# Patient Record
Sex: FEMALE | Race: WHITE | Employment: PART TIME | URBAN - NONMETROPOLITAN AREA
[De-identification: names, ages, dates, MRNs, and addresses within clinical notes are randomized per-mention and may not be internally consistent; named-entity substitution may affect disease eponyms.]

---

## 2017-04-07 ENCOUNTER — TELEPHONE (OUTPATIENT)
Dept: SURGERY | Age: 22
End: 2017-04-07

## 2017-04-07 ENCOUNTER — APPOINTMENT (OUTPATIENT)
Dept: ULTRASOUND IMAGING | Age: 22
End: 2017-04-07
Payer: MEDICAID

## 2017-04-07 ENCOUNTER — HOSPITAL ENCOUNTER (EMERGENCY)
Age: 22
Discharge: HOME | End: 2017-04-07
Attending: EMERGENCY MEDICINE
Payer: MEDICAID

## 2017-04-07 VITALS
DIASTOLIC BLOOD PRESSURE: 88 MMHG | TEMPERATURE: 97.5 F | BODY MASS INDEX: 33.46 KG/M2 | SYSTOLIC BLOOD PRESSURE: 138 MMHG | OXYGEN SATURATION: 97 % | HEIGHT: 64 IN | RESPIRATION RATE: 20 BRPM | WEIGHT: 196 LBS | HEART RATE: 83 BPM

## 2017-04-07 DIAGNOSIS — K80.20 CALCULUS OF GALLBLADDER WITHOUT CHOLECYSTITIS WITHOUT OBSTRUCTION: Primary | ICD-10-CM

## 2017-04-07 DIAGNOSIS — K80.50 BILIARY COLIC: ICD-10-CM

## 2017-04-07 LAB
ALBUMIN SERPL-MCNC: 4.6 G/DL (ref 3.5–5.2)
ALP BLD-CCNC: 90 U/L (ref 35–104)
ALT SERPL-CCNC: 24 U/L (ref 5–33)
ANION GAP SERPL CALCULATED.3IONS-SCNC: 12 MMOL/L (ref 7–19)
AST SERPL-CCNC: 21 U/L (ref 5–32)
BASOPHILS ABSOLUTE: 0 K/UL (ref 0–0.2)
BASOPHILS RELATIVE PERCENT: 0.2 % (ref 0–1)
BILIRUB SERPL-MCNC: <0.2 MG/DL (ref 0.2–1.2)
BILIRUBIN URINE: NEGATIVE
BLOOD, URINE: NEGATIVE
BUN BLDV-MCNC: 11 MG/DL (ref 6–20)
CALCIUM SERPL-MCNC: 9.1 MG/DL (ref 8.6–10)
CHLORIDE BLD-SCNC: 103 MMOL/L (ref 98–111)
CLARITY: ABNORMAL
CO2: 25 MMOL/L (ref 22–29)
COLOR: YELLOW
CREAT SERPL-MCNC: 0.6 MG/DL (ref 0.5–0.9)
EOSINOPHILS ABSOLUTE: 0.2 K/UL (ref 0–0.6)
EOSINOPHILS RELATIVE PERCENT: 2.5 % (ref 0–5)
GFR NON-AFRICAN AMERICAN: >60
GLOBULIN: 2.7 G/DL
GLUCOSE BLD-MCNC: 111 MG/DL (ref 74–109)
GLUCOSE URINE: NEGATIVE MG/DL
HCG(URINE) PREGNANCY TEST: NEGATIVE
HCT VFR BLD CALC: 40.4 % (ref 37–47)
HEMOGLOBIN: 13.7 G/DL (ref 12–16)
KETONES, URINE: NEGATIVE MG/DL
LEUKOCYTE ESTERASE, URINE: NEGATIVE
LIPASE: 33 U/L (ref 13–60)
LYMPHOCYTES ABSOLUTE: 2.5 K/UL (ref 1.1–4.5)
LYMPHOCYTES RELATIVE PERCENT: 29.1 % (ref 20–40)
MCH RBC QN AUTO: 31.1 PG (ref 27–31)
MCHC RBC AUTO-ENTMCNC: 33.9 G/DL (ref 33–37)
MCV RBC AUTO: 91.8 FL (ref 81–99)
MONOCYTES ABSOLUTE: 0.4 K/UL (ref 0–0.9)
MONOCYTES RELATIVE PERCENT: 5.2 % (ref 0–10)
NEUTROPHILS ABSOLUTE: 5.3 K/UL (ref 1.5–7.5)
NEUTROPHILS RELATIVE PERCENT: 62.9 % (ref 50–65)
NITRITE, URINE: NEGATIVE
PDW BLD-RTO: 12.3 % (ref 11.5–14.5)
PH UA: 6
PLATELET # BLD: 383 K/UL (ref 130–400)
PMV BLD AUTO: 10.4 FL (ref 7.4–10.4)
POTASSIUM SERPL-SCNC: 4.1 MMOL/L (ref 3.5–5)
PROTEIN UA: NEGATIVE MG/DL
RBC # BLD: 4.4 M/UL (ref 4.2–5.4)
SODIUM BLD-SCNC: 140 MMOL/L (ref 136–145)
SPECIFIC GRAVITY UA: 1.02
TOTAL PROTEIN: 7.3 G/DL (ref 6.6–8.7)
UROBILINOGEN, URINE: 0.2 E.U./DL
WBC # BLD: 8.5 K/UL (ref 4.8–10.8)

## 2017-04-07 PROCEDURE — 81003 URINALYSIS AUTO W/O SCOPE: CPT

## 2017-04-07 PROCEDURE — 80053 COMPREHEN METABOLIC PANEL: CPT

## 2017-04-07 PROCEDURE — 85025 COMPLETE CBC W/AUTO DIFF WBC: CPT

## 2017-04-07 PROCEDURE — 6370000000 HC RX 637 (ALT 250 FOR IP): Performed by: EMERGENCY MEDICINE

## 2017-04-07 PROCEDURE — 96374 THER/PROPH/DIAG INJ IV PUSH: CPT

## 2017-04-07 PROCEDURE — 99284 EMERGENCY DEPT VISIT MOD MDM: CPT | Performed by: EMERGENCY MEDICINE

## 2017-04-07 PROCEDURE — 99284 EMERGENCY DEPT VISIT MOD MDM: CPT

## 2017-04-07 PROCEDURE — 76705 ECHO EXAM OF ABDOMEN: CPT

## 2017-04-07 PROCEDURE — 36415 COLL VENOUS BLD VENIPUNCTURE: CPT

## 2017-04-07 PROCEDURE — 83690 ASSAY OF LIPASE: CPT

## 2017-04-07 PROCEDURE — 6360000002 HC RX W HCPCS: Performed by: EMERGENCY MEDICINE

## 2017-04-07 PROCEDURE — 81025 URINE PREGNANCY TEST: CPT

## 2017-04-07 RX ORDER — KETOROLAC TROMETHAMINE 30 MG/ML
15 INJECTION, SOLUTION INTRAMUSCULAR; INTRAVENOUS ONCE
Status: COMPLETED | OUTPATIENT
Start: 2017-04-07 | End: 2017-04-07

## 2017-04-07 RX ORDER — ONDANSETRON 4 MG/1
4 TABLET, ORALLY DISINTEGRATING ORAL EVERY 8 HOURS PRN
Qty: 30 TABLET | Refills: 0 | Status: SHIPPED | OUTPATIENT
Start: 2017-04-07 | End: 2022-04-26

## 2017-04-07 RX ADMIN — Medication 30 ML: at 07:49

## 2017-04-07 RX ADMIN — KETOROLAC TROMETHAMINE 15 MG: 30 INJECTION, SOLUTION INTRAMUSCULAR at 07:49

## 2017-04-07 ASSESSMENT — ENCOUNTER SYMPTOMS
SHORTNESS OF BREATH: 0
NAUSEA: 1
VOMITING: 0
ABDOMINAL PAIN: 1
COUGH: 0

## 2017-04-07 ASSESSMENT — PAIN DESCRIPTION - DESCRIPTORS: DESCRIPTORS: BURNING;ACHING;SHARP

## 2017-04-07 ASSESSMENT — PAIN DESCRIPTION - PAIN TYPE: TYPE: ACUTE PAIN

## 2017-04-07 ASSESSMENT — PAIN DESCRIPTION - LOCATION: LOCATION: ABDOMEN

## 2017-04-07 ASSESSMENT — PAIN DESCRIPTION - FREQUENCY: FREQUENCY: CONTINUOUS

## 2017-04-07 ASSESSMENT — PAIN DESCRIPTION - PROGRESSION: CLINICAL_PROGRESSION: GRADUALLY WORSENING

## 2017-04-07 ASSESSMENT — PAIN DESCRIPTION - ONSET: ONSET: AWAKENED FROM SLEEP

## 2017-04-07 ASSESSMENT — PAIN SCALES - GENERAL: PAINLEVEL_OUTOF10: 5

## 2017-06-22 ENCOUNTER — OFFICE VISIT (OUTPATIENT)
Dept: CARDIOLOGY | Facility: CLINIC | Age: 22
End: 2017-06-22

## 2017-06-22 VITALS
HEIGHT: 64 IN | HEART RATE: 63 BPM | SYSTOLIC BLOOD PRESSURE: 108 MMHG | WEIGHT: 204 LBS | BODY MASS INDEX: 34.83 KG/M2 | DIASTOLIC BLOOD PRESSURE: 70 MMHG | OXYGEN SATURATION: 99 %

## 2017-06-22 DIAGNOSIS — I25.3 ATRIAL SEPTAL ANEURYSM: ICD-10-CM

## 2017-06-22 DIAGNOSIS — R00.2 PALPITATIONS: ICD-10-CM

## 2017-06-22 DIAGNOSIS — Q21.12 PFO (PATENT FORAMEN OVALE): Primary | ICD-10-CM

## 2017-06-22 DIAGNOSIS — E66.09 NON MORBID OBESITY DUE TO EXCESS CALORIES: ICD-10-CM

## 2017-06-22 PROBLEM — K21.9 GERD (GASTROESOPHAGEAL REFLUX DISEASE): Status: ACTIVE | Noted: 2017-06-22

## 2017-06-22 PROBLEM — E66.9 OBESITY: Status: ACTIVE | Noted: 2017-06-22

## 2017-06-22 PROCEDURE — 93000 ELECTROCARDIOGRAM COMPLETE: CPT | Performed by: INTERNAL MEDICINE

## 2017-06-22 PROCEDURE — 99214 OFFICE O/P EST MOD 30 MIN: CPT | Performed by: INTERNAL MEDICINE

## 2017-06-22 RX ORDER — EPINEPHRINE 0.3 MG/.3ML
0.3 INJECTION SUBCUTANEOUS AS NEEDED
COMMUNITY
Start: 2017-04-20 | End: 2018-06-20

## 2017-06-22 RX ORDER — ESCITALOPRAM OXALATE 10 MG/1
10 TABLET ORAL DAILY
COMMUNITY
Start: 2017-06-20 | End: 2018-06-20

## 2018-06-20 ENCOUNTER — OFFICE VISIT (OUTPATIENT)
Dept: CARDIOLOGY | Facility: CLINIC | Age: 23
End: 2018-06-20

## 2018-06-20 VITALS
DIASTOLIC BLOOD PRESSURE: 70 MMHG | SYSTOLIC BLOOD PRESSURE: 110 MMHG | OXYGEN SATURATION: 99 % | BODY MASS INDEX: 34.31 KG/M2 | HEART RATE: 94 BPM | WEIGHT: 201 LBS | HEIGHT: 64 IN

## 2018-06-20 DIAGNOSIS — R00.2 PALPITATIONS: ICD-10-CM

## 2018-06-20 DIAGNOSIS — Q21.12 PFO (PATENT FORAMEN OVALE): Primary | ICD-10-CM

## 2018-06-20 DIAGNOSIS — R06.02 SHORTNESS OF BREATH: ICD-10-CM

## 2018-06-20 DIAGNOSIS — I25.3 ATRIAL SEPTAL ANEURYSM: ICD-10-CM

## 2018-06-20 DIAGNOSIS — E66.09 CLASS 1 OBESITY DUE TO EXCESS CALORIES WITHOUT SERIOUS COMORBIDITY WITH BODY MASS INDEX (BMI) OF 34.0 TO 34.9 IN ADULT: ICD-10-CM

## 2018-06-20 PROCEDURE — 99214 OFFICE O/P EST MOD 30 MIN: CPT | Performed by: INTERNAL MEDICINE

## 2018-06-20 PROCEDURE — 93000 ELECTROCARDIOGRAM COMPLETE: CPT | Performed by: INTERNAL MEDICINE

## 2018-06-20 RX ORDER — NITROFURANTOIN 25; 75 MG/1; MG/1
100 CAPSULE ORAL 2 TIMES DAILY
COMMUNITY
Start: 2018-06-16 | End: 2018-06-24

## 2018-06-28 ENCOUNTER — OUTSIDE FACILITY SERVICE (OUTPATIENT)
Dept: CARDIOLOGY | Facility: CLINIC | Age: 23
End: 2018-06-28

## 2018-06-28 PROCEDURE — 93306 TTE W/DOPPLER COMPLETE: CPT | Performed by: INTERNAL MEDICINE

## 2018-06-29 ENCOUNTER — TELEPHONE (OUTPATIENT)
Dept: CARDIOLOGY | Facility: CLINIC | Age: 23
End: 2018-06-29

## 2019-06-19 ENCOUNTER — OFFICE VISIT (OUTPATIENT)
Dept: CARDIOLOGY | Facility: CLINIC | Age: 24
End: 2019-06-19

## 2019-06-19 VITALS
HEIGHT: 64 IN | BODY MASS INDEX: 36.37 KG/M2 | WEIGHT: 213 LBS | OXYGEN SATURATION: 99 % | HEART RATE: 69 BPM | DIASTOLIC BLOOD PRESSURE: 82 MMHG | SYSTOLIC BLOOD PRESSURE: 110 MMHG

## 2019-06-19 DIAGNOSIS — I25.3 ATRIAL SEPTAL ANEURYSM: ICD-10-CM

## 2019-06-19 DIAGNOSIS — R00.2 PALPITATIONS: ICD-10-CM

## 2019-06-19 DIAGNOSIS — R42 DIZZINESS: ICD-10-CM

## 2019-06-19 DIAGNOSIS — Q21.12 PFO (PATENT FORAMEN OVALE): Primary | ICD-10-CM

## 2019-06-19 DIAGNOSIS — E66.09 CLASS 2 OBESITY DUE TO EXCESS CALORIES WITHOUT SERIOUS COMORBIDITY WITH BODY MASS INDEX (BMI) OF 36.0 TO 36.9 IN ADULT: ICD-10-CM

## 2019-06-19 PROCEDURE — 93000 ELECTROCARDIOGRAM COMPLETE: CPT | Performed by: INTERNAL MEDICINE

## 2019-06-19 PROCEDURE — 99214 OFFICE O/P EST MOD 30 MIN: CPT | Performed by: INTERNAL MEDICINE

## 2019-07-12 ENCOUNTER — TELEPHONE (OUTPATIENT)
Dept: CARDIOLOGY | Facility: CLINIC | Age: 24
End: 2019-07-12

## 2019-10-21 ENCOUNTER — TELEPHONE (OUTPATIENT)
Dept: CARDIOLOGY | Facility: CLINIC | Age: 24
End: 2019-10-21

## 2020-03-02 ENCOUNTER — TELEPHONE (OUTPATIENT)
Dept: CARDIOLOGY | Facility: CLINIC | Age: 25
End: 2020-03-02

## 2020-03-02 DIAGNOSIS — Q21.10 ASD (ATRIAL SEPTAL DEFECT): Primary | ICD-10-CM

## 2020-03-13 ENCOUNTER — APPOINTMENT (OUTPATIENT)
Dept: CARDIOLOGY | Facility: HOSPITAL | Age: 25
End: 2020-03-13

## 2020-07-14 ENCOUNTER — TELEPHONE (OUTPATIENT)
Dept: CARDIOLOGY | Facility: CLINIC | Age: 25
End: 2020-07-14

## 2020-07-14 DIAGNOSIS — R00.2 PALPITATIONS: Primary | ICD-10-CM

## 2020-07-28 ENCOUNTER — TELEPHONE (OUTPATIENT)
Dept: CARDIOLOGY | Facility: CLINIC | Age: 25
End: 2020-07-28

## 2020-08-05 ENCOUNTER — OFFICE VISIT (OUTPATIENT)
Dept: CARDIOLOGY | Facility: CLINIC | Age: 25
End: 2020-08-05

## 2020-08-05 VITALS
WEIGHT: 205 LBS | BODY MASS INDEX: 35 KG/M2 | DIASTOLIC BLOOD PRESSURE: 80 MMHG | HEART RATE: 62 BPM | SYSTOLIC BLOOD PRESSURE: 120 MMHG | OXYGEN SATURATION: 98 % | HEIGHT: 64 IN

## 2020-08-05 DIAGNOSIS — I25.3 ATRIAL SEPTAL ANEURYSM: ICD-10-CM

## 2020-08-05 DIAGNOSIS — E66.09 CLASS 2 OBESITY DUE TO EXCESS CALORIES WITHOUT SERIOUS COMORBIDITY WITH BODY MASS INDEX (BMI) OF 35.0 TO 35.9 IN ADULT: ICD-10-CM

## 2020-08-05 DIAGNOSIS — Q21.12 PFO (PATENT FORAMEN OVALE): Primary | ICD-10-CM

## 2020-08-05 DIAGNOSIS — R07.9 CHEST PAIN, UNSPECIFIED TYPE: ICD-10-CM

## 2020-08-05 DIAGNOSIS — R00.2 PALPITATIONS: ICD-10-CM

## 2020-08-05 PROBLEM — E66.812 CLASS 2 OBESITY DUE TO EXCESS CALORIES WITHOUT SERIOUS COMORBIDITY WITH BODY MASS INDEX (BMI) OF 35.0 TO 35.9 IN ADULT: Status: ACTIVE | Noted: 2017-06-22

## 2020-08-05 PROCEDURE — 93000 ELECTROCARDIOGRAM COMPLETE: CPT | Performed by: INTERNAL MEDICINE

## 2020-08-05 PROCEDURE — 99214 OFFICE O/P EST MOD 30 MIN: CPT | Performed by: INTERNAL MEDICINE

## 2020-08-10 ENCOUNTER — OFFICE VISIT (OUTPATIENT)
Dept: UROLOGY | Facility: CLINIC | Age: 25
End: 2020-08-10

## 2020-08-10 VITALS — HEIGHT: 64 IN | TEMPERATURE: 97.4 F | BODY MASS INDEX: 35.37 KG/M2 | WEIGHT: 207.2 LBS

## 2020-08-10 DIAGNOSIS — N39.41 URGE INCONTINENCE OF URINE: ICD-10-CM

## 2020-08-10 DIAGNOSIS — R33.9 INCOMPLETE BLADDER EMPTYING: ICD-10-CM

## 2020-08-10 DIAGNOSIS — R35.0 FREQUENCY OF URINATION: ICD-10-CM

## 2020-08-10 DIAGNOSIS — R39.15 URGENCY OF URINATION: Primary | ICD-10-CM

## 2020-08-10 LAB
BILIRUB BLD-MCNC: NEGATIVE MG/DL
CLARITY, POC: CLEAR
COLOR UR: YELLOW
GLUCOSE UR STRIP-MCNC: NEGATIVE MG/DL
KETONES UR QL: NEGATIVE
LEUKOCYTE EST, POC: NEGATIVE
NITRITE UR-MCNC: NEGATIVE MG/ML
PH UR: 5 [PH] (ref 5–8)
PROT UR STRIP-MCNC: NEGATIVE MG/DL
RBC # UR STRIP: NEGATIVE /UL
SP GR UR: 1.03 (ref 1–1.03)
UROBILINOGEN UR QL: NORMAL

## 2020-08-10 PROCEDURE — 99204 OFFICE O/P NEW MOD 45 MIN: CPT | Performed by: UROLOGY

## 2020-08-10 PROCEDURE — 51798 US URINE CAPACITY MEASURE: CPT | Performed by: UROLOGY

## 2020-08-10 RX ORDER — OXYBUTYNIN CHLORIDE 5 MG/1
5 TABLET, EXTENDED RELEASE ORAL DAILY
Qty: 90 TABLET | Refills: 3 | Status: SHIPPED | OUTPATIENT
Start: 2020-08-10 | End: 2021-08-05

## 2020-11-30 ENCOUNTER — TELEPHONE (OUTPATIENT)
Dept: NEUROLOGY | Age: 25
End: 2020-11-30

## 2020-11-30 NOTE — TELEPHONE ENCOUNTER
Called spoke with patient about an appointment with Dr Kimberley Ceballos , patient is aware of the location and the appointment with Dr Kimberley Ceballos.

## 2021-01-27 ENCOUNTER — TELEPHONE (OUTPATIENT)
Dept: NEUROLOGY | Age: 26
End: 2021-01-27

## 2021-08-05 ENCOUNTER — OFFICE VISIT (OUTPATIENT)
Dept: CARDIOLOGY | Facility: CLINIC | Age: 26
End: 2021-08-05

## 2021-08-05 VITALS
DIASTOLIC BLOOD PRESSURE: 80 MMHG | SYSTOLIC BLOOD PRESSURE: 118 MMHG | WEIGHT: 208 LBS | HEIGHT: 64 IN | OXYGEN SATURATION: 99 % | HEART RATE: 74 BPM | BODY MASS INDEX: 35.51 KG/M2

## 2021-08-05 DIAGNOSIS — E66.09 CLASS 2 OBESITY DUE TO EXCESS CALORIES WITHOUT SERIOUS COMORBIDITY WITH BODY MASS INDEX (BMI) OF 36.0 TO 36.9 IN ADULT: ICD-10-CM

## 2021-08-05 DIAGNOSIS — R06.09 DYSPNEA ON EXERTION: Primary | ICD-10-CM

## 2021-08-05 DIAGNOSIS — I25.3 ATRIAL SEPTAL ANEURYSM: ICD-10-CM

## 2021-08-05 DIAGNOSIS — Q21.12 PFO (PATENT FORAMEN OVALE): ICD-10-CM

## 2021-08-05 DIAGNOSIS — R55 NEAR SYNCOPE: ICD-10-CM

## 2021-08-05 PROCEDURE — 99214 OFFICE O/P EST MOD 30 MIN: CPT | Performed by: INTERNAL MEDICINE

## 2021-08-05 PROCEDURE — 93000 ELECTROCARDIOGRAM COMPLETE: CPT | Performed by: INTERNAL MEDICINE

## 2021-08-19 ENCOUNTER — OUTSIDE FACILITY SERVICE (OUTPATIENT)
Dept: CARDIOLOGY | Facility: CLINIC | Age: 26
End: 2021-08-19

## 2021-08-19 PROCEDURE — OUTSIDEPOS PR OUTSIDE POS PLACEHOLDER: Performed by: INTERNAL MEDICINE

## 2021-08-19 PROCEDURE — 93306 TTE W/DOPPLER COMPLETE: CPT | Performed by: INTERNAL MEDICINE

## 2021-08-24 ENCOUNTER — TELEPHONE (OUTPATIENT)
Dept: CARDIOLOGY | Facility: CLINIC | Age: 26
End: 2021-08-24

## 2021-11-29 ENCOUNTER — TELEPHONE (OUTPATIENT)
Dept: CARDIOLOGY | Facility: CLINIC | Age: 26
End: 2021-11-29

## 2022-01-05 ENCOUNTER — TELEPHONE (OUTPATIENT)
Dept: CARDIOLOGY | Facility: CLINIC | Age: 27
End: 2022-01-05

## 2022-03-21 ENCOUNTER — OFFICE VISIT (OUTPATIENT)
Dept: NEUROLOGY | Facility: CLINIC | Age: 27
End: 2022-03-21

## 2022-03-21 VITALS
DIASTOLIC BLOOD PRESSURE: 70 MMHG | SYSTOLIC BLOOD PRESSURE: 102 MMHG | BODY MASS INDEX: 36.09 KG/M2 | WEIGHT: 211.4 LBS | HEIGHT: 64 IN | OXYGEN SATURATION: 99 % | HEART RATE: 100 BPM

## 2022-03-21 DIAGNOSIS — R20.2 PARESTHESIA: ICD-10-CM

## 2022-03-21 DIAGNOSIS — R25.1 TREMOR: Primary | ICD-10-CM

## 2022-03-21 PROCEDURE — 99204 OFFICE O/P NEW MOD 45 MIN: CPT | Performed by: PSYCHIATRY & NEUROLOGY

## 2022-03-21 RX ORDER — PROPRANOLOL HYDROCHLORIDE 20 MG/1
20 TABLET ORAL 2 TIMES DAILY
Qty: 60 TABLET | Refills: 3 | Status: SHIPPED | OUTPATIENT
Start: 2022-03-21 | End: 2022-08-10

## 2022-03-22 ENCOUNTER — TELEPHONE (OUTPATIENT)
Dept: NEUROLOGY | Facility: OTHER | Age: 27
End: 2022-03-22

## 2022-03-30 ENCOUNTER — HOSPITAL ENCOUNTER (OUTPATIENT)
Dept: MRI IMAGING | Facility: HOSPITAL | Age: 27
Discharge: HOME | End: 2022-03-30
Admitting: PSYCHIATRY & NEUROLOGY

## 2022-03-30 DIAGNOSIS — R25.1 TREMOR: ICD-10-CM

## 2022-03-30 DIAGNOSIS — R20.2 PARESTHESIA: ICD-10-CM

## 2022-03-30 PROCEDURE — 70551 MRI BRAIN STEM W/O DYE: CPT

## 2022-04-26 ENCOUNTER — TELEPHONE (OUTPATIENT)
Dept: ENT CLINIC | Age: 27
End: 2022-04-26

## 2022-04-26 ENCOUNTER — OFFICE VISIT (OUTPATIENT)
Dept: ENT CLINIC | Age: 27
End: 2022-04-26
Payer: MEDICAID

## 2022-04-26 ENCOUNTER — PROCEDURE VISIT (OUTPATIENT)
Dept: ENT CLINIC | Age: 27
End: 2022-04-26
Payer: MEDICAID

## 2022-04-26 VITALS
SYSTOLIC BLOOD PRESSURE: 120 MMHG | DIASTOLIC BLOOD PRESSURE: 72 MMHG | BODY MASS INDEX: 33.46 KG/M2 | HEIGHT: 64 IN | WEIGHT: 196 LBS

## 2022-04-26 DIAGNOSIS — H93.13 TINNITUS OF BOTH EARS: ICD-10-CM

## 2022-04-26 DIAGNOSIS — H93.292 ABNORMAL AUDITORY PERCEPTION OF LEFT EAR: Primary | ICD-10-CM

## 2022-04-26 DIAGNOSIS — H93.13 BILATERAL NONPULSATILE TINNITUS: Primary | ICD-10-CM

## 2022-04-26 PROCEDURE — 99203 OFFICE O/P NEW LOW 30 MIN: CPT | Performed by: PHYSICIAN ASSISTANT

## 2022-04-26 PROCEDURE — G8417 CALC BMI ABV UP PARAM F/U: HCPCS | Performed by: PHYSICIAN ASSISTANT

## 2022-04-26 PROCEDURE — G8427 DOCREV CUR MEDS BY ELIG CLIN: HCPCS | Performed by: PHYSICIAN ASSISTANT

## 2022-04-26 PROCEDURE — 92567 TYMPANOMETRY: CPT | Performed by: AUDIOLOGIST

## 2022-04-26 PROCEDURE — 4004F PT TOBACCO SCREEN RCVD TLK: CPT | Performed by: PHYSICIAN ASSISTANT

## 2022-04-26 PROCEDURE — 92553 AUDIOMETRY AIR & BONE: CPT | Performed by: AUDIOLOGIST

## 2022-04-26 RX ORDER — CETIRIZINE HYDROCHLORIDE 10 MG/1
TABLET ORAL
COMMUNITY

## 2022-04-26 RX ORDER — ERGOCALCIFEROL (VITAMIN D2) 1250 MCG
CAPSULE ORAL
COMMUNITY
Start: 2021-12-14

## 2022-04-26 ASSESSMENT — ENCOUNTER SYMPTOMS
VOICE CHANGE: 0
EYE DISCHARGE: 0
RHINORRHEA: 0
FACIAL SWELLING: 0
TROUBLE SWALLOWING: 0
EYE PAIN: 0
SORE THROAT: 0
SINUS PAIN: 0
SINUS PRESSURE: 0

## 2022-04-26 NOTE — PROGRESS NOTES
History   Tammy Carpenter is a 32 y.o. female who presented to the clinic this date with complaints of decreased hearing in her left ear and bilateral tinnitus. She reported difficulty hearing in background noise. She reported symptoms have been ongoing since the end of 2013 following a left ear infection. Summary   Tympanometry consistent with normal TM mobility bilaterally. Pure tone testing indicates normal hearing bilaterally. Results   Otoscopy:    Right: Clear EAC/Normal TM   Left: Clear EAC/Normal TM    Audiometry:    Right: Hearing WNL     Left: Hearing WNL           Tympanometry:     Right: Type A   Left: Type A    Plan   Results of today's testing were discussed with Ms. Cat Almonte and the following recommendations were made:    1. Follow up with ENT as scheduled.         Audiogram and Acoustic Immittance

## 2022-04-26 NOTE — PROGRESS NOTES
DEEP OTOLARYNGOLOGY/ENT  Aline is a pleasant 69-year-old  female that was referred by Sal Mazariegos due to problems with hearing loss. Patient reports that over the last 12 to 13 years she has suffered with nonpulsatile tinnitus. She reports that the tinnitus is most notable in a quiet room. She has no issues with insomnia. She reports that now she is having some hearing changes that have been present for about 3 to 4 months. She reports that she cannot hear background noises or high pitches. 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 Audiology studies were performed today and reviewed with the patient. Patient was noted with no hearing deficit bilaterally with tympanogram noted to be type A bilaterally. Allergies: Patient has no known allergies. Current Outpatient Medications   Medication Sig Dispense Refill    ergocalciferol (ERGOCALCIFEROL) 1.25 MG (68153 UT) capsule ergocalciferol (vitamin D2) 1,250 mcg (50,000 unit) capsule   TAKE 1 CAPSULE BY MOUTH ONCE A WEEK      cetirizine (ZYRTEC) 10 MG tablet cetirizine 10 mg tablet   TAKE 1 TABLET BY MOUTH ONCE DAILY       No current facility-administered medications for this visit. Past Surgical History:   Procedure Laterality Date    TUBAL LIGATION Bilateral        Past Medical History:   Diagnosis Date    Atrial aneurysm     Atrial septal defect        History reviewed. No pertinent family history. Social History     Tobacco Use    Smoking status: Never Smoker    Smokeless tobacco: Not on file   Substance Use Topics    Alcohol use: No           REVIEW OF SYSTEMS:  all other systems reviewed and are negative  Review of Systems   Constitutional: Negative for chills and fever. HENT: Positive for tinnitus.  Negative for congestion, dental problem, ear discharge, ear pain, facial swelling, hearing loss, nosebleeds, postnasal drip, rhinorrhea, sinus pressure, sinus pain,

## 2022-04-26 NOTE — TELEPHONE ENCOUNTER
I faxed Noted to [employer]. LM letting patient know.
Patient is calling   to request referral to [facility]. Per pt was advised to have office fax office notes and hearing test results. Did not provide fax number. Please return call to update Patient on status. The best time to call is  Anytime    Thank you!
Left ear ache

## 2022-04-26 NOTE — ASSESSMENT & PLAN NOTE
Nonpulsatile tinnitus bilaterally with perceived hearing loss  Plan: Due to the patient having difficulty at work, I recommend a hearing aid trial to see if this helps with her hearing issues. I have also recommended Lipo flavonoid 3 times a day. Patient was advised to have yearly audiology screening.   She is to follow-up with me as needed

## 2022-04-27 ENCOUNTER — HOSPITAL ENCOUNTER (OUTPATIENT)
Dept: NEUROLOGY | Facility: HOSPITAL | Age: 27
Discharge: HOME | End: 2022-04-27
Admitting: PSYCHIATRY & NEUROLOGY

## 2022-04-27 DIAGNOSIS — R20.2 PARESTHESIA: ICD-10-CM

## 2022-04-27 DIAGNOSIS — G56.03 CARPAL TUNNEL SYNDROME, BILATERAL: Primary | ICD-10-CM

## 2022-04-27 PROCEDURE — 95885 MUSC TST DONE W/NERV TST LIM: CPT

## 2022-04-27 PROCEDURE — 95911 NRV CNDJ TEST 9-10 STUDIES: CPT

## 2022-04-27 PROCEDURE — 95911 NRV CNDJ TEST 9-10 STUDIES: CPT | Performed by: PSYCHIATRY & NEUROLOGY

## 2022-04-27 PROCEDURE — 95885 MUSC TST DONE W/NERV TST LIM: CPT | Performed by: PSYCHIATRY & NEUROLOGY

## 2022-06-06 DIAGNOSIS — R20.2 PARESTHESIA: Primary | ICD-10-CM

## 2022-06-23 ENCOUNTER — APPOINTMENT (OUTPATIENT)
Dept: MRI IMAGING | Facility: HOSPITAL | Age: 27
End: 2022-06-23

## 2022-06-30 ENCOUNTER — TELEPHONE (OUTPATIENT)
Dept: NEUROLOGY | Facility: CLINIC | Age: 27
End: 2022-06-30

## 2022-07-07 ENCOUNTER — APPOINTMENT (OUTPATIENT)
Dept: MRI IMAGING | Facility: HOSPITAL | Age: 27
End: 2022-07-07

## 2022-07-11 ENCOUNTER — TELEPHONE (OUTPATIENT)
Dept: NEUROLOGY | Facility: CLINIC | Age: 27
End: 2022-07-11

## 2022-08-10 ENCOUNTER — OFFICE VISIT (OUTPATIENT)
Dept: CARDIOLOGY | Facility: CLINIC | Age: 27
End: 2022-08-10

## 2022-08-10 VITALS
DIASTOLIC BLOOD PRESSURE: 82 MMHG | HEIGHT: 64 IN | SYSTOLIC BLOOD PRESSURE: 120 MMHG | BODY MASS INDEX: 35.34 KG/M2 | OXYGEN SATURATION: 99 % | WEIGHT: 207 LBS | HEART RATE: 71 BPM

## 2022-08-10 DIAGNOSIS — E66.09 CLASS 2 OBESITY DUE TO EXCESS CALORIES WITHOUT SERIOUS COMORBIDITY WITH BODY MASS INDEX (BMI) OF 36.0 TO 36.9 IN ADULT: ICD-10-CM

## 2022-08-10 DIAGNOSIS — Z01.818 PREOPERATIVE EVALUATION TO RULE OUT SURGICAL CONTRAINDICATION: ICD-10-CM

## 2022-08-10 DIAGNOSIS — R06.09 DYSPNEA ON EXERTION: ICD-10-CM

## 2022-08-10 DIAGNOSIS — Q21.12 PATENT FORAMEN OVALE: Primary | ICD-10-CM

## 2022-08-10 DIAGNOSIS — I25.3 ATRIAL SEPTAL ANEURYSM: ICD-10-CM

## 2022-08-10 PROCEDURE — 99213 OFFICE O/P EST LOW 20 MIN: CPT | Performed by: INTERNAL MEDICINE

## 2022-08-10 PROCEDURE — 93000 ELECTROCARDIOGRAM COMPLETE: CPT | Performed by: INTERNAL MEDICINE

## 2022-08-10 RX ORDER — CLINDAMYCIN HYDROCHLORIDE 300 MG/1
CAPSULE ORAL
COMMUNITY
Start: 2022-08-05 | End: 2022-12-30

## 2022-08-11 ENCOUNTER — PATIENT ROUNDING (BHMG ONLY) (OUTPATIENT)
Dept: CARDIOLOGY | Facility: CLINIC | Age: 27
End: 2022-08-11

## 2022-12-30 ENCOUNTER — OFFICE VISIT (OUTPATIENT)
Dept: UROLOGY | Facility: CLINIC | Age: 27
End: 2022-12-30

## 2022-12-30 VITALS — HEIGHT: 63 IN | TEMPERATURE: 97 F | WEIGHT: 211 LBS | BODY MASS INDEX: 37.39 KG/M2

## 2022-12-30 DIAGNOSIS — R10.2 PELVIC PAIN: ICD-10-CM

## 2022-12-30 DIAGNOSIS — N39.0 FREQUENT UTI: Primary | ICD-10-CM

## 2022-12-30 PROCEDURE — 99214 OFFICE O/P EST MOD 30 MIN: CPT

## 2023-01-04 ENCOUNTER — TELEPHONE (OUTPATIENT)
Dept: UROLOGY | Facility: CLINIC | Age: 28
End: 2023-01-04
Payer: COMMERCIAL

## 2023-01-05 ENCOUNTER — TELEPHONE (OUTPATIENT)
Dept: UROLOGY | Facility: CLINIC | Age: 28
End: 2023-01-05
Payer: COMMERCIAL

## 2023-01-13 ENCOUNTER — PROCEDURE VISIT (OUTPATIENT)
Dept: UROLOGY | Facility: CLINIC | Age: 28
End: 2023-01-13
Payer: COMMERCIAL

## 2023-01-13 DIAGNOSIS — N39.0 FREQUENT UTI: Primary | ICD-10-CM

## 2023-01-13 PROCEDURE — 51701 INSERT BLADDER CATHETER: CPT

## 2023-01-13 PROCEDURE — 87147 CULTURE TYPE IMMUNOLOGIC: CPT

## 2023-01-13 PROCEDURE — 87086 URINE CULTURE/COLONY COUNT: CPT

## 2023-01-14 DIAGNOSIS — N30.00 ACUTE CYSTITIS WITHOUT HEMATURIA: Primary | ICD-10-CM

## 2023-01-14 LAB — BACTERIA SPEC AEROBE CULT: ABNORMAL

## 2023-01-14 RX ORDER — AMOXICILLIN AND CLAVULANATE POTASSIUM 875; 125 MG/1; MG/1
1 TABLET, FILM COATED ORAL 2 TIMES DAILY
Qty: 20 TABLET | Refills: 0 | Status: SHIPPED | OUTPATIENT
Start: 2023-01-14

## 2023-05-24 ENCOUNTER — TELEPHONE (OUTPATIENT)
Dept: CARDIOLOGY | Facility: CLINIC | Age: 28
End: 2023-05-24
Payer: COMMERCIAL

## 2023-05-24 ENCOUNTER — TELEPHONE (OUTPATIENT)
Dept: CARDIOLOGY | Facility: CLINIC | Age: 28
End: 2023-05-24

## 2023-08-01 ENCOUNTER — TELEPHONE (OUTPATIENT)
Dept: CARDIOLOGY | Facility: CLINIC | Age: 28
End: 2023-08-01
Payer: COMMERCIAL

## 2024-05-29 ENCOUNTER — OFFICE VISIT (OUTPATIENT)
Dept: CARDIOLOGY | Facility: CLINIC | Age: 29
End: 2024-05-29
Payer: COMMERCIAL

## 2024-05-29 VITALS
WEIGHT: 214 LBS | DIASTOLIC BLOOD PRESSURE: 80 MMHG | BODY MASS INDEX: 37.92 KG/M2 | HEART RATE: 81 BPM | HEIGHT: 63 IN | OXYGEN SATURATION: 95 % | SYSTOLIC BLOOD PRESSURE: 120 MMHG

## 2024-05-29 DIAGNOSIS — R07.9 CHEST PAIN, UNSPECIFIED TYPE: ICD-10-CM

## 2024-05-29 DIAGNOSIS — Q21.12 PATENT FORAMEN OVALE: Primary | ICD-10-CM

## 2024-05-29 DIAGNOSIS — I25.3 ATRIAL SEPTAL ANEURYSM: ICD-10-CM

## 2024-05-29 PROCEDURE — 93000 ELECTROCARDIOGRAM COMPLETE: CPT | Performed by: INTERNAL MEDICINE

## 2024-05-29 PROCEDURE — 1160F RVW MEDS BY RX/DR IN RCRD: CPT | Performed by: INTERNAL MEDICINE

## 2024-05-29 PROCEDURE — 1159F MED LIST DOCD IN RCRD: CPT | Performed by: INTERNAL MEDICINE

## 2024-05-29 PROCEDURE — 99214 OFFICE O/P EST MOD 30 MIN: CPT | Performed by: INTERNAL MEDICINE

## 2024-05-29 RX ORDER — EPINEPHRINE 0.3 MG/.3ML
INJECTION SUBCUTANEOUS
COMMUNITY
Start: 2024-03-14

## 2024-05-29 RX ORDER — RIZATRIPTAN BENZOATE 10 MG/1
TABLET ORAL
COMMUNITY
Start: 2024-03-14

## 2024-05-29 RX ORDER — CHOLECALCIFEROL (VITAMIN D3) 1250 MCG
50000 CAPSULE ORAL
COMMUNITY
Start: 2024-03-15

## 2024-05-30 ENCOUNTER — TELEPHONE (OUTPATIENT)
Dept: CARDIOLOGY | Facility: CLINIC | Age: 29
End: 2024-05-30
Payer: COMMERCIAL

## 2024-06-03 ENCOUNTER — TELEPHONE (OUTPATIENT)
Dept: CARDIOLOGY | Facility: CLINIC | Age: 29
End: 2024-06-03

## 2024-06-17 ENCOUNTER — OUTSIDE FACILITY SERVICE (OUTPATIENT)
Dept: CARDIOLOGY | Facility: CLINIC | Age: 29
End: 2024-06-17
Payer: COMMERCIAL

## 2024-06-21 ENCOUNTER — TELEPHONE (OUTPATIENT)
Dept: CARDIOLOGY | Facility: CLINIC | Age: 29
End: 2024-06-21
Payer: COMMERCIAL

## 2024-08-01 ENCOUNTER — OFFICE VISIT (OUTPATIENT)
Dept: ENT CLINIC | Age: 29
End: 2024-08-01
Payer: COMMERCIAL

## 2024-08-01 VITALS
DIASTOLIC BLOOD PRESSURE: 88 MMHG | SYSTOLIC BLOOD PRESSURE: 110 MMHG | HEIGHT: 64 IN | WEIGHT: 218 LBS | BODY MASS INDEX: 37.22 KG/M2

## 2024-08-01 DIAGNOSIS — H69.93 EUSTACHIAN TUBE DYSFUNCTION, BILATERAL: Primary | ICD-10-CM

## 2024-08-01 DIAGNOSIS — J34.89 SINUS PRESSURE: ICD-10-CM

## 2024-08-01 DIAGNOSIS — Z91.09 ENVIRONMENTAL ALLERGIES: ICD-10-CM

## 2024-08-01 PROCEDURE — 99213 OFFICE O/P EST LOW 20 MIN: CPT | Performed by: NURSE PRACTITIONER

## 2024-08-01 RX ORDER — MONTELUKAST SODIUM 10 MG/1
10 TABLET ORAL NIGHTLY
Qty: 30 TABLET | Refills: 5 | Status: SHIPPED | OUTPATIENT
Start: 2024-08-01

## 2024-08-01 RX ORDER — AZELASTINE 1 MG/ML
1 SPRAY, METERED NASAL 2 TIMES DAILY
Qty: 60 ML | Refills: 1 | Status: SHIPPED | OUTPATIENT
Start: 2024-08-01

## 2024-08-01 RX ORDER — PREDNISONE 20 MG/1
TABLET ORAL
Qty: 21 TABLET | Refills: 0 | Status: SHIPPED | OUTPATIENT
Start: 2024-08-01

## 2024-08-01 ASSESSMENT — ENCOUNTER SYMPTOMS
EYES NEGATIVE: 1
RHINORRHEA: 1
RESPIRATORY NEGATIVE: 1
ALLERGIC/IMMUNOLOGIC NEGATIVE: 1
SINUS PRESSURE: 1
GASTROINTESTINAL NEGATIVE: 1

## 2024-08-01 NOTE — PROGRESS NOTES
2024    Aline Martell (:  1995) is a 29 y.o. female, Established patient, here for evaluation of the following chief complaint(s):  Follow-up (Having fluid in ears/)      Vitals:    24 1350   BP: 110/88   Site: Right Upper Arm   Position: Sitting   Cuff Size: Medium Adult   Weight: 98.9 kg (218 lb)   Height: 1.626 m (5' 4\")       Wt Readings from Last 3 Encounters:   24 98.9 kg (218 lb)   22 88.9 kg (196 lb)   17 88.9 kg (196 lb)       BP Readings from Last 3 Encounters:   24 110/88   22 120/72   17 138/88         SUBJECTIVE/OBJECTIVE:    Patient seen today for her ears. She states that her ears initially started bothering her a couple of months ago when allergy season started. She states that in  she went to her PCP and was given an antibiotic, Sudafed, Flonase, and told to restart her Zyrtec. She states that it did not get better. She went back and they gave her a steroid shot and told her to continue the Flonase and Zyrtec. She complains of bilateral aural fullness, muffled hearing, and pressure in her ears. She does note some sinus pressure that has improved with the Zyrtec. She reports some clear drainage. She denies nasal congestion.        Review of Systems   Constitutional: Negative.    HENT:  Positive for ear pain (pressure and fullness), hearing loss (muffled), rhinorrhea and sinus pressure.    Eyes: Negative.    Respiratory: Negative.     Cardiovascular: Negative.    Gastrointestinal: Negative.    Endocrine: Negative.    Musculoskeletal: Negative.    Skin: Negative.    Allergic/Immunologic: Negative.    Neurological: Negative.    Hematological: Negative.    Psychiatric/Behavioral: Negative.          Physical Exam  Vitals reviewed.   Constitutional:       Appearance: Normal appearance. She is normal weight.   HENT:      Head: Normocephalic and atraumatic.      Right Ear: Tympanic membrane, ear canal and external ear normal.      Left Ear:

## 2024-08-05 ENCOUNTER — HOSPITAL ENCOUNTER (OUTPATIENT)
Dept: GENERAL RADIOLOGY | Age: 29
Discharge: HOME | End: 2024-08-05
Payer: COMMERCIAL

## 2024-08-05 DIAGNOSIS — J34.89 SINUS PRESSURE: ICD-10-CM

## 2024-08-05 PROCEDURE — 70486 CT MAXILLOFACIAL W/O DYE: CPT

## 2024-08-07 ENCOUNTER — TELEPHONE (OUTPATIENT)
Dept: ENT CLINIC | Age: 29
End: 2024-08-07

## 2024-08-07 DIAGNOSIS — H69.93 EUSTACHIAN TUBE DYSFUNCTION, BILATERAL: Primary | ICD-10-CM

## 2024-08-07 RX ORDER — CLINDAMYCIN HYDROCHLORIDE 300 MG/1
300 CAPSULE ORAL 3 TIMES DAILY
Qty: 30 CAPSULE | Refills: 0 | Status: SHIPPED | OUTPATIENT
Start: 2024-08-07 | End: 2024-08-17

## 2024-08-07 NOTE — TELEPHONE ENCOUNTER
Called patient and she reports that her ears have been painful the last couple of days. She states that she stopped the prednisone due to it hurting her stomach. She has previously been on amoxicillin for this. We will send her in a different antibiotic. She is agreeable to this. She has a follow up with Dr. Ferrera 8/28.

## 2024-08-12 ENCOUNTER — TELEPHONE (OUTPATIENT)
Dept: ENT CLINIC | Age: 29
End: 2024-08-12

## 2024-08-12 NOTE — TELEPHONE ENCOUNTER
Called patient and reviewed CT scan results. She voiced understanding. Keep follow up with Dr. Ferrera that is scheduled for 828/24.

## 2024-08-28 ENCOUNTER — OFFICE VISIT (OUTPATIENT)
Dept: ENT CLINIC | Age: 29
End: 2024-08-28
Payer: COMMERCIAL

## 2024-08-28 VITALS
HEIGHT: 64 IN | DIASTOLIC BLOOD PRESSURE: 76 MMHG | WEIGHT: 217 LBS | SYSTOLIC BLOOD PRESSURE: 118 MMHG | BODY MASS INDEX: 37.05 KG/M2

## 2024-08-28 DIAGNOSIS — H93.8X3 PRESSURE SENSATION IN BOTH EARS: Primary | ICD-10-CM

## 2024-08-28 PROCEDURE — G8427 DOCREV CUR MEDS BY ELIG CLIN: HCPCS | Performed by: OTOLARYNGOLOGY

## 2024-08-28 PROCEDURE — 1036F TOBACCO NON-USER: CPT | Performed by: OTOLARYNGOLOGY

## 2024-08-28 PROCEDURE — 99213 OFFICE O/P EST LOW 20 MIN: CPT | Performed by: OTOLARYNGOLOGY

## 2024-08-28 PROCEDURE — G8417 CALC BMI ABV UP PARAM F/U: HCPCS | Performed by: OTOLARYNGOLOGY

## 2024-08-28 RX ORDER — PSEUDOEPHEDRINE HCL 120 MG/1
120 TABLET, FILM COATED, EXTENDED RELEASE ORAL EVERY 12 HOURS
Qty: 20 TABLET | Refills: 0 | Status: SHIPPED | OUTPATIENT
Start: 2024-08-28 | End: 2024-09-07

## 2024-08-28 RX ORDER — PSEUDOEPHEDRINE HCL 120 MG/1
120 TABLET, FILM COATED, EXTENDED RELEASE ORAL EVERY 12 HOURS
Qty: 20 TABLET | Refills: 1 | Status: SHIPPED | OUTPATIENT
Start: 2024-08-28 | End: 2024-08-28 | Stop reason: CLARIF

## 2024-08-28 ASSESSMENT — ENCOUNTER SYMPTOMS
GASTROINTESTINAL NEGATIVE: 1
ALLERGIC/IMMUNOLOGIC NEGATIVE: 1
RESPIRATORY NEGATIVE: 1
EYES NEGATIVE: 1

## 2024-08-28 NOTE — PROGRESS NOTES
2024    Aline Martell (:  1995) is a 29 y.o. female, Established patient, here for evaluation of the following chief complaint(s):  New Patient (Ears)      Vitals:    24 1051   BP: 118/76   Weight: 98.4 kg (217 lb)   Height: 1.626 m (5' 4\")       Wt Readings from Last 3 Encounters:   24 98.4 kg (217 lb)   24 98.9 kg (218 lb)   22 88.9 kg (196 lb)       BP Readings from Last 3 Encounters:   24 118/76   24 110/88   22 120/72         SUBJECTIVE/OBJECTIVE:    Patient seen today for her ears.  She said for several months she has had pressure and pain in both ears.  She said can affect her hearing at times.  She was placed on steroids and Astelin and she feels the Astelin is helping a bit.        Review of Systems   Constitutional: Negative.    HENT: Negative.          Pressure sensation   Eyes: Negative.    Respiratory: Negative.     Cardiovascular: Negative.    Gastrointestinal: Negative.    Endocrine: Negative.    Musculoskeletal: Negative.    Skin: Negative.    Allergic/Immunologic: Negative.    Neurological: Negative.    Hematological: Negative.    Psychiatric/Behavioral: Negative.          Physical Exam  Vitals reviewed.   Constitutional:       Appearance: Normal appearance. She is normal weight.   HENT:      Head: Normocephalic and atraumatic.      Right Ear: Tympanic membrane, ear canal and external ear normal.      Left Ear: Tympanic membrane, ear canal and external ear normal.      Nose: Nose normal.      Mouth/Throat:      Mouth: Mucous membranes are moist.      Pharynx: Oropharynx is clear.   Eyes:      Extraocular Movements: Extraocular movements intact.      Pupils: Pupils are equal, round, and reactive to light.   Cardiovascular:      Rate and Rhythm: Normal rate and regular rhythm.   Pulmonary:      Effort: Pulmonary effort is normal.      Breath sounds: Normal breath sounds.   Musculoskeletal:      Cervical back: Normal range of motion.

## 2024-09-17 ENCOUNTER — OFFICE VISIT (OUTPATIENT)
Dept: ENT CLINIC | Age: 29
End: 2024-09-17
Payer: COMMERCIAL

## 2024-09-17 VITALS
HEIGHT: 64 IN | WEIGHT: 215 LBS | DIASTOLIC BLOOD PRESSURE: 86 MMHG | BODY MASS INDEX: 36.7 KG/M2 | SYSTOLIC BLOOD PRESSURE: 124 MMHG

## 2024-09-17 DIAGNOSIS — Z91.09 ENVIRONMENTAL ALLERGIES: Primary | ICD-10-CM

## 2024-09-17 PROCEDURE — G8417 CALC BMI ABV UP PARAM F/U: HCPCS | Performed by: OTOLARYNGOLOGY

## 2024-09-17 PROCEDURE — 1036F TOBACCO NON-USER: CPT | Performed by: OTOLARYNGOLOGY

## 2024-09-17 PROCEDURE — G8427 DOCREV CUR MEDS BY ELIG CLIN: HCPCS | Performed by: OTOLARYNGOLOGY

## 2024-09-17 PROCEDURE — 99214 OFFICE O/P EST MOD 30 MIN: CPT | Performed by: OTOLARYNGOLOGY

## 2024-09-17 RX ORDER — FEXOFENADINE HCL 180 MG/1
180 TABLET ORAL DAILY
Qty: 30 TABLET | Refills: 3 | Status: SHIPPED | OUTPATIENT
Start: 2024-09-17

## 2024-09-17 ASSESSMENT — ENCOUNTER SYMPTOMS
EYES NEGATIVE: 1
GASTROINTESTINAL NEGATIVE: 1
ALLERGIC/IMMUNOLOGIC NEGATIVE: 1
RESPIRATORY NEGATIVE: 1

## 2024-10-23 ENCOUNTER — OFFICE VISIT (OUTPATIENT)
Dept: INTERNAL MEDICINE | Facility: CLINIC | Age: 29
End: 2024-10-23
Payer: COMMERCIAL

## 2024-10-23 VITALS
HEIGHT: 64 IN | RESPIRATION RATE: 17 BRPM | SYSTOLIC BLOOD PRESSURE: 128 MMHG | WEIGHT: 230 LBS | DIASTOLIC BLOOD PRESSURE: 82 MMHG | HEART RATE: 109 BPM | BODY MASS INDEX: 39.27 KG/M2 | OXYGEN SATURATION: 99 %

## 2024-10-23 DIAGNOSIS — Z11.59 ENCOUNTER FOR HEPATITIS C SCREENING TEST FOR LOW RISK PATIENT: ICD-10-CM

## 2024-10-23 DIAGNOSIS — E66.09 CLASS 2 OBESITY DUE TO EXCESS CALORIES WITH BODY MASS INDEX (BMI) OF 39.0 TO 39.9 IN ADULT, UNSPECIFIED WHETHER SERIOUS COMORBIDITY PRESENT: Primary | ICD-10-CM

## 2024-10-23 DIAGNOSIS — Z00.00 ROUTINE GENERAL MEDICAL EXAMINATION AT A HEALTH CARE FACILITY: ICD-10-CM

## 2024-10-23 DIAGNOSIS — R53.82 CHRONIC FATIGUE: ICD-10-CM

## 2024-10-23 DIAGNOSIS — E53.8 VITAMIN B12 DEFICIENCY: ICD-10-CM

## 2024-10-23 DIAGNOSIS — E66.812 CLASS 2 OBESITY DUE TO EXCESS CALORIES WITH BODY MASS INDEX (BMI) OF 39.0 TO 39.9 IN ADULT, UNSPECIFIED WHETHER SERIOUS COMORBIDITY PRESENT: Primary | ICD-10-CM

## 2024-10-23 DIAGNOSIS — E55.9 VITAMIN D DEFICIENCY: ICD-10-CM

## 2024-10-23 RX ORDER — FEXOFENADINE HCL 180 MG/1
1 TABLET ORAL DAILY
COMMUNITY
Start: 2024-09-20

## 2024-10-23 RX ORDER — AZELASTINE HYDROCHLORIDE 137 UG/1
SPRAY, METERED NASAL
COMMUNITY
Start: 2024-08-01

## 2024-10-23 RX ORDER — MONTELUKAST SODIUM 10 MG/1
10 TABLET ORAL NIGHTLY
COMMUNITY
Start: 2024-08-01 | End: 2024-10-23

## 2024-10-23 NOTE — PROGRESS NOTES
Subjective     Chief Complaint   Patient presents with    Annual Exam       History of Present Illness  History of Present Illness  The patient presents for evaluation of multiple medical concerns.    She is seeking a new primary care physician and wishes to discuss her vitamin D levels. She has a history of vitamin D deficiency, which was managed with high-dose weekly supplements until her levels normalized in the 30s. Currently, she is taking a daily dose of 1000 IU but has been experiencing fatigue and is unsure if the supplement is effective.    She reports a history of atrial septal defect and atrial aneurysm, which are monitored annually by Dr. Mejias. During her pregnancies, she experienced lightheadedness and weakness, initially attributed to normal pregnancy symptoms. Further testing revealed her cardiac conditions.    She also reports a history of gallbladder issues, which cause stomach pain after consuming fried foods. She underwent a breast biopsy in 2020 due to a spot detected on an ultrasound, which was determined to be a fibroadenoma. She had a hysterectomy in 2019, but her ovaries were preserved. Her last Pap smear was conducted in September 2023. She reports no presence of blood in her stool or urine.    She is interested in discussing weight loss strategies. 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 She also engages in physical activities such as running, weight lifting, and hiking. However, since starting her new job in April 2024, she has been less active. She reports no history of pancreatitis or thyroid issues.    She takes Allegra and magnesium salt as needed. She hurt her neck badly in sixth grade while on a ride at the fair, which caused long-term issues. She has seen a neurologist for it and uses BenGay for relief. Her migraines are currently under control.    FAMILY  HISTORY  Both of her grandmothers and great grandmother had breast cancer.    Patient's PMR from outside medical facility reviewed and noted.    Review of Systems   Constitutional:  Positive for fatigue.   Neurological:  Positive for headaches.        Otherwise complete ROS reviewed and negative except as mentioned in the HPI.    Past Medical History:   Past Medical History:   Diagnosis Date    Abdominal pain     ASD (atrial septal defect)     Chest pain at rest     Coronary artery disease 2014    Atrial Septal Defect and Atrial Septal Aneurysm    Dizziness     TORRES (dyspnea on exertion)     Dyspnea     GERD (gastroesophageal reflux disease)     Hyperlipidemia     Kidney stone 5/2/2022    Migraine     Palpitation     Sinus tachycardia     Syncope     Urinary tract infection     Recurrent UTIs    Vitamin D deficiency      Past Surgical History:  Past Surgical History:   Procedure Laterality Date    CHOLECYSTECTOMY      HYSTERECTOMY      KIDNEY SURGERY      Kidney stents    TUBAL ABDOMINAL LIGATION      WISDOM TOOTH EXTRACTION       Social History:  reports that she has quit smoking. Her smoking use included cigarettes and electronic cigarette. She has a 0.8 pack-year smoking history. She has never used smokeless tobacco. She reports that she does not drink alcohol and does not use drugs.    Family History: family history includes Asthma in her daughter; Brain cancer in an other family member; Breast cancer in an other family member; Cancer in her maternal grandmother and paternal grandfather; Diabetes in her maternal aunt, maternal grandfather, mother, and paternal grandmother; Heart disease in her maternal grandfather, maternal grandmother, paternal grandfather, and paternal grandmother; Hyperlipidemia in her mother and paternal grandmother; Hypertension in her maternal grandfather, mother, and paternal grandmother; Stroke in her mother and paternal grandmother.      Allergies:  Allergies   Allergen Reactions    Bee  Venom Itching    Cat Hair Extract Other (See Comments)    Dog Epithelium (Canis Lupus Familiaris) Other (See Comments)    Wasp Venom Hives     Medications:  Prior to Admission medications    Medication Sig Start Date End Date Taking? Authorizing Provider   Azelastine HCl 137 MCG/SPRAY solution USE 1 SPRAY(S) IN EACH NOSTRIL TWICE DAILY AS DIRECTED 8/1/24  Yes Juan Clark MD   fexofenadine (ALLEGRA) 180 MG tablet Take 1 tablet by mouth Daily. 9/20/24  Yes Juan Clark MD   Cholecalciferol (Vitamin D3) 1.25 MG (58692 UT) capsule Take 1 capsule by mouth Every 7 (Seven) Days. 3/15/24 10/23/24 Yes Juan Clark MD   montelukast (SINGULAIR) 10 MG tablet Take 1 tablet by mouth Every Night. 8/1/24 10/23/24 Yes Juan Clark MD   EPINEPHrine (EPIPEN) 0.3 MG/0.3ML solution auto-injector injection INJECT CONTENTS OF 1 PEN AS NEEDED FOR ALLERGIC REACTION  Patient not taking: Reported on 10/23/2024 3/14/24   Juan Clark MD   rizatriptan (MAXALT) 10 MG tablet TAKE 1 TABLET BY MOUTH ONCE DAILY FOR 8 DAYS  Patient not taking: Reported on 10/23/2024 3/14/24   Juan Clark MD       MARCI:        PHQ-9 Depression Screening  Little interest or pleasure in doing things? Not at all   Feeling down, depressed, or hopeless? Not at all   PHQ-2 Total Score 0   Trouble falling or staying asleep, or sleeping too much?     Feeling tired or having little energy?     Poor appetite or overeating?     Feeling bad about yourself - or that you are a failure or have let yourself or your family down?     Trouble concentrating on things, such as reading the newspaper or watching television?     Moving or speaking so slowly that other people could have noticed? Or the opposite - being so fidgety or restless that you have been moving around a lot more than usual?     Thoughts that you would be better off dead, or of hurting yourself in some way?     PHQ-9 Total Score     If you checked off any problems,  "how difficult have these problems made it for you to do your work, take care of things at home, or get along with other people?         PHQ-9 Total Score:     0 (Negative screening for depression)  Support given, observe for worsening symptoms    Objective     Vital Signs: /82 (BP Location: Left arm, Patient Position: Sitting, Cuff Size: Adult)   Pulse 109   Resp 17   Ht 161.9 cm (63.75\")   Wt 104 kg (230 lb)   SpO2 99%   BMI 39.79 kg/m²   Physical Exam  Vitals and nursing note reviewed.   Constitutional:       General: She is not in acute distress.     Appearance: Normal appearance. She is obese. She is not ill-appearing.   HENT:      Head: Normocephalic and atraumatic.      Right Ear: External ear normal.      Left Ear: External ear normal.      Nose: Nose normal.      Mouth/Throat:      Mouth: Mucous membranes are moist.      Pharynx: No posterior oropharyngeal erythema.   Eyes:      General: No scleral icterus.     Extraocular Movements: Extraocular movements intact.      Conjunctiva/sclera: Conjunctivae normal.      Pupils: Pupils are equal, round, and reactive to light.   Cardiovascular:      Rate and Rhythm: Normal rate and regular rhythm.      Pulses: Normal pulses.      Heart sounds: Normal heart sounds.   Pulmonary:      Effort: Pulmonary effort is normal. No respiratory distress.      Breath sounds: Normal breath sounds. No wheezing.   Abdominal:      General: Abdomen is flat. Bowel sounds are normal.      Palpations: Abdomen is soft.      Tenderness: There is no abdominal tenderness.   Musculoskeletal:         General: Normal range of motion.      Cervical back: Normal range of motion.      Right lower leg: No edema.      Left lower leg: No edema.   Skin:     General: Skin is warm and dry.      Findings: No erythema or rash.   Neurological:      General: No focal deficit present.      Mental Status: She is alert and oriented to person, place, and time. Mental status is at baseline.      " "Motor: No weakness.   Psychiatric:         Mood and Affect: Mood normal.         Behavior: Behavior normal.         Thought Content: Thought content normal.         Judgment: Judgment normal.         Class 2 Severe Obesity (BMI >=35 and <=39.9). Obesity-related health conditions include the following: coronary heart disease. Obesity is unchanged. BMI is is above average; no BMI management plan is appropriate. We discussed portion control and increasing exercise.      Advance Care Planning   ACP discussion was held with the patient during this visit.         Results Reviewed:  No results found for: \"GLUCOSE\", \"BUN\", \"CREATININE\", \"NA\", \"K\", \"CL\", \"CO2\", \"CALCIUM\", \"ALT\", \"AST\", \"WBC\", \"HCT\", \"PLT\", \"CHOL\", \"TRIG\", \"HDL\", \"LDL\", \"LDLHDL\", \"HGBA1C\"      Assessment / Plan     Assessment/Plan:    1. Class 2 obesity due to excess calories with body mass index (BMI) of 39.0 to 39.9 in adult, unspecified whether serious comorbidity present      2. Chronic fatigue    - TSH  - T4, Free    3. Vitamin D deficiency    - Vitamin D,25-Hydroxy    4. Vitamin B12 deficiency    - Vitamin B12    5. Routine general medical examination at a health care facility    - CBC & Differential  - Comprehensive Metabolic Panel  - Lipid Panel  - Hemoglobin A1c    6. Encounter for hepatitis C screening test for low risk patient    - Hepatitis C antibody    Diagnoses and all orders for this visit:    1. Class 2 obesity due to excess calories with body mass index (BMI) of 39.0 to 39.9 in adult, unspecified whether serious comorbidity present (Primary)    2. Chronic fatigue  -     TSH  -     T4, Free    3. Vitamin D deficiency  -     Vitamin D,25-Hydroxy    4. Vitamin B12 deficiency  -     Vitamin B12    5. Routine general medical examination at a health care facility  -     CBC & Differential  -     Comprehensive Metabolic Panel  -     Lipid Panel  -     Hemoglobin A1c    6. Encounter for hepatitis C screening test for low risk patient  -     " Hepatitis C antibody        Assessment & Plan  1. Vitamin D Deficiency.  The patient has a history of fluctuating vitamin D levels and was previously on a high dose. She has been taking 1000 IU daily but reports increased fatigue. A lab test for vitamin D levels will be ordered to reassess her current status.    2. Weight Management.  Phentermine is not recommended due to her cardiac history. Injectable weight loss medications such as Wegovy or Saxenda are suggested. She is advised to contact her insurance provider ([employer]) regarding their weight loss program and enrollment process. If she qualifies, she should send a [employer] message to proceed with the prescription.    3. Atrial Septal Defect and Atrial Aneurysm.  She is being monitored by Dr. Mejias annually and will continue to do so. No new symptoms reported.    4. History of Hysterectomy.  She had a total hysterectomy in 2019 but retained her ovaries. No hormone replacement therapy is needed.    5. Health Maintenance.  A mammogram is recommended at age 35 due to family history of breast cancer. Monthly self-breast exams are advised, preferably a few weeks post-menstruation. Last Pap smear was in September 2023.    Follow-up  Return in 6 months for follow-up.    Return in about 6 months (around 4/23/2025) for Recheck. unless patient needs to be seen sooner or acute issues arise.      I have discussed the patient results/orders and and plan/recommendation with them at today's visit.    Patient or patient representative verbalized consent for the use of Ambient Listening during the visit with  DELFIN Knapp for chart documentation. 10/23/2024  13:44 CDT  DELFIN Knapp   10/23/2024

## 2024-10-24 ENCOUNTER — PATIENT ROUNDING (BHMG ONLY) (OUTPATIENT)
Dept: INTERNAL MEDICINE | Facility: CLINIC | Age: 29
End: 2024-10-24
Payer: COMMERCIAL

## 2024-10-25 LAB
25(OH)D3+25(OH)D2 SERPL-MCNC: 23.9 NG/ML (ref 30–100)
ALBUMIN SERPL-MCNC: 4.2 G/DL (ref 4–5)
ALP SERPL-CCNC: 85 IU/L (ref 44–121)
ALT SERPL-CCNC: 37 IU/L (ref 0–32)
AST SERPL-CCNC: 26 IU/L (ref 0–40)
BASOPHILS # BLD AUTO: 0 X10E3/UL (ref 0–0.2)
BASOPHILS NFR BLD AUTO: 0 %
BILIRUB SERPL-MCNC: 0.3 MG/DL (ref 0–1.2)
BUN SERPL-MCNC: 11 MG/DL (ref 6–20)
BUN/CREAT SERPL: 17 (ref 9–23)
CALCIUM SERPL-MCNC: 9.5 MG/DL (ref 8.7–10.2)
CHLORIDE SERPL-SCNC: 103 MMOL/L (ref 96–106)
CHOLEST SERPL-MCNC: 223 MG/DL (ref 100–199)
CO2 SERPL-SCNC: 21 MMOL/L (ref 20–29)
CREAT SERPL-MCNC: 0.66 MG/DL (ref 0.57–1)
EGFRCR SERPLBLD CKD-EPI 2021: 122 ML/MIN/1.73
EOSINOPHIL # BLD AUTO: 0.1 X10E3/UL (ref 0–0.4)
EOSINOPHIL NFR BLD AUTO: 1 %
ERYTHROCYTE [DISTWIDTH] IN BLOOD BY AUTOMATED COUNT: 12.7 % (ref 11.7–15.4)
GLOBULIN SER CALC-MCNC: 2.8 G/DL (ref 1.5–4.5)
GLUCOSE SERPL-MCNC: 110 MG/DL (ref 70–99)
HBA1C MFR BLD: 5.8 % (ref 4.8–5.6)
HCT VFR BLD AUTO: 42.3 % (ref 34–46.6)
HCV IGG SERPL QL IA: NON REACTIVE
HDLC SERPL-MCNC: 51 MG/DL
HGB BLD-MCNC: 13.7 G/DL (ref 11.1–15.9)
IMM GRANULOCYTES # BLD AUTO: 0 X10E3/UL (ref 0–0.1)
IMM GRANULOCYTES NFR BLD AUTO: 0 %
LDLC SERPL CALC-MCNC: 140 MG/DL (ref 0–99)
LYMPHOCYTES # BLD AUTO: 2.4 X10E3/UL (ref 0.7–3.1)
LYMPHOCYTES NFR BLD AUTO: 24 %
MCH RBC QN AUTO: 30.8 PG (ref 26.6–33)
MCHC RBC AUTO-ENTMCNC: 32.4 G/DL (ref 31.5–35.7)
MCV RBC AUTO: 95 FL (ref 79–97)
MONOCYTES # BLD AUTO: 0.4 X10E3/UL (ref 0.1–0.9)
MONOCYTES NFR BLD AUTO: 4 %
NEUTROPHILS # BLD AUTO: 7.3 X10E3/UL (ref 1.4–7)
NEUTROPHILS NFR BLD AUTO: 71 %
PLATELET # BLD AUTO: 394 X10E3/UL (ref 150–450)
POTASSIUM SERPL-SCNC: 4.3 MMOL/L (ref 3.5–5.2)
PROT SERPL-MCNC: 7 G/DL (ref 6–8.5)
RBC # BLD AUTO: 4.45 X10E6/UL (ref 3.77–5.28)
SODIUM SERPL-SCNC: 138 MMOL/L (ref 134–144)
T4 FREE SERPL-MCNC: 1.12 NG/DL (ref 0.82–1.77)
TRIGL SERPL-MCNC: 181 MG/DL (ref 0–149)
TSH SERPL DL<=0.005 MIU/L-ACNC: 0.68 UIU/ML (ref 0.45–4.5)
VIT B12 SERPL-MCNC: 400 PG/ML (ref 232–1245)
VLDLC SERPL CALC-MCNC: 32 MG/DL (ref 5–40)
WBC # BLD AUTO: 10.2 X10E3/UL (ref 3.4–10.8)

## 2024-10-25 NOTE — PROGRESS NOTES
Cholesterol is a little elevated, and LDL is significantly high. With her heart history I think it would be very wise to place her on statin therapy to assist in lowering these to help prevent plague development. Let me know if she is ok with this.   Also vitamin D is low, advise OTC replacement  A1c is a tenth of a point in the prediabetes range. I really want her to monitor carb and sugar intake and limit those

## 2024-11-19 DIAGNOSIS — E66.09 CLASS 2 OBESITY DUE TO EXCESS CALORIES WITHOUT SERIOUS COMORBIDITY WITH BODY MASS INDEX (BMI) OF 36.0 TO 36.9 IN ADULT: ICD-10-CM

## 2024-11-19 DIAGNOSIS — E66.09 CLASS 2 OBESITY DUE TO EXCESS CALORIES WITHOUT SERIOUS COMORBIDITY WITH BODY MASS INDEX (BMI) OF 36.0 TO 36.9 IN ADULT: Primary | ICD-10-CM

## 2024-11-19 DIAGNOSIS — E66.812 CLASS 2 OBESITY DUE TO EXCESS CALORIES WITHOUT SERIOUS COMORBIDITY WITH BODY MASS INDEX (BMI) OF 36.0 TO 36.9 IN ADULT: ICD-10-CM

## 2024-11-19 DIAGNOSIS — E66.812 CLASS 2 OBESITY DUE TO EXCESS CALORIES WITHOUT SERIOUS COMORBIDITY WITH BODY MASS INDEX (BMI) OF 36.0 TO 36.9 IN ADULT: Primary | ICD-10-CM

## 2024-11-25 ENCOUNTER — OFFICE VISIT (OUTPATIENT)
Dept: ENT CLINIC | Age: 29
End: 2024-11-25
Payer: COMMERCIAL

## 2024-11-25 VITALS
WEIGHT: 223 LBS | SYSTOLIC BLOOD PRESSURE: 120 MMHG | DIASTOLIC BLOOD PRESSURE: 82 MMHG | HEIGHT: 64 IN | BODY MASS INDEX: 38.07 KG/M2

## 2024-11-25 DIAGNOSIS — Z91.09 ENVIRONMENTAL ALLERGIES: Primary | ICD-10-CM

## 2024-11-25 DIAGNOSIS — H93.8X3 PRESSURE SENSATION IN BOTH EARS: ICD-10-CM

## 2024-11-25 PROCEDURE — G8484 FLU IMMUNIZE NO ADMIN: HCPCS | Performed by: OTOLARYNGOLOGY

## 2024-11-25 PROCEDURE — 99214 OFFICE O/P EST MOD 30 MIN: CPT | Performed by: OTOLARYNGOLOGY

## 2024-11-25 PROCEDURE — 1036F TOBACCO NON-USER: CPT | Performed by: OTOLARYNGOLOGY

## 2024-11-25 PROCEDURE — G8417 CALC BMI ABV UP PARAM F/U: HCPCS | Performed by: OTOLARYNGOLOGY

## 2024-11-25 PROCEDURE — G8427 DOCREV CUR MEDS BY ELIG CLIN: HCPCS | Performed by: OTOLARYNGOLOGY

## 2024-11-25 PROCEDURE — 69420 INCISION OF EARDRUM: CPT | Performed by: OTOLARYNGOLOGY

## 2024-11-25 RX ORDER — OFLOXACIN 3 MG/ML
5 SOLUTION AURICULAR (OTIC) 2 TIMES DAILY
Qty: 5 ML | Refills: 0 | Status: SHIPPED | OUTPATIENT
Start: 2024-11-25 | End: 2024-11-30

## 2024-11-25 ASSESSMENT — ENCOUNTER SYMPTOMS
GASTROINTESTINAL NEGATIVE: 1
RESPIRATORY NEGATIVE: 1
EYES NEGATIVE: 1
ALLERGIC/IMMUNOLOGIC NEGATIVE: 1

## 2024-11-25 NOTE — PROGRESS NOTES
2024    Aline Martell (:  1995) is a 29 y.o. female, Established patient, here for evaluation of the following chief complaint(s):  Follow-up (ALLERGIES )      Vitals:    24 1403   BP: 120/82   Weight: 101.2 kg (223 lb)   Height: 1.626 m (5' 4\")       Wt Readings from Last 3 Encounters:   24 101.2 kg (223 lb)   24 97.5 kg (215 lb)   24 98.4 kg (217 lb)       BP Readings from Last 3 Encounters:   24 120/82   24 124/86   24 118/76         SUBJECTIVE/OBJECTIVE:    Patient seen today for her ears.  Her allergy is doing better but her ear still feel full.  She says they f sound muffled on both sides.        Review of Systems   Constitutional: Negative.    HENT: Negative.     Eyes: Negative.    Respiratory: Negative.     Cardiovascular: Negative.    Gastrointestinal: Negative.    Endocrine: Negative.    Musculoskeletal: Negative.    Skin: Negative.    Allergic/Immunologic: Negative.    Neurological: Negative.    Hematological: Negative.    Psychiatric/Behavioral: Negative.          Physical Exam  Vitals reviewed.   Constitutional:       Appearance: Normal appearance. She is normal weight.   HENT:      Head: Normocephalic and atraumatic.      Right Ear: Tympanic membrane, ear canal and external ear normal.      Left Ear: Tympanic membrane, ear canal and external ear normal.      Ears:      Comments: Type B tympanograms bilaterally.     Nose: Nose normal.      Mouth/Throat:      Mouth: Mucous membranes are moist.      Pharynx: Oropharynx is clear.   Eyes:      Extraocular Movements: Extraocular movements intact.      Pupils: Pupils are equal, round, and reactive to light.   Cardiovascular:      Rate and Rhythm: Normal rate and regular rhythm.   Pulmonary:      Effort: Pulmonary effort is normal.      Breath sounds: Normal breath sounds.   Musculoskeletal:      Cervical back: Normal range of motion.   Skin:     General: Skin is warm and dry.   Neurological:

## 2025-02-24 ENCOUNTER — TELEPHONE (OUTPATIENT)
Dept: ENT CLINIC | Age: 30
End: 2025-02-24

## 2025-02-24 NOTE — TELEPHONE ENCOUNTER
Aline called to schedule an appointment for Follow Up/ same day. Pt would like to be seen today. Per pt  feels like ear drum is going to pop anytime she sneezes or coughs. Had procedure a few months ago. [facility] was unable to accommodate in the time frame needed. Please be advised that the best time to call Anytime.    Thank you.

## 2025-02-25 ENCOUNTER — OFFICE VISIT (OUTPATIENT)
Dept: ENT CLINIC | Age: 30
End: 2025-02-25
Payer: COMMERCIAL

## 2025-02-25 VITALS
DIASTOLIC BLOOD PRESSURE: 72 MMHG | HEIGHT: 64 IN | BODY MASS INDEX: 37.9 KG/M2 | WEIGHT: 222 LBS | SYSTOLIC BLOOD PRESSURE: 124 MMHG

## 2025-02-25 DIAGNOSIS — Z91.09 ENVIRONMENTAL ALLERGIES: Primary | ICD-10-CM

## 2025-02-25 DIAGNOSIS — H93.8X3 PRESSURE SENSATION IN BOTH EARS: ICD-10-CM

## 2025-02-25 PROCEDURE — 1036F TOBACCO NON-USER: CPT | Performed by: OTOLARYNGOLOGY

## 2025-02-25 PROCEDURE — 99214 OFFICE O/P EST MOD 30 MIN: CPT | Performed by: OTOLARYNGOLOGY

## 2025-02-25 PROCEDURE — G8417 CALC BMI ABV UP PARAM F/U: HCPCS | Performed by: OTOLARYNGOLOGY

## 2025-02-25 PROCEDURE — G8427 DOCREV CUR MEDS BY ELIG CLIN: HCPCS | Performed by: OTOLARYNGOLOGY

## 2025-02-25 PROCEDURE — 92504 EAR MICROSCOPY EXAMINATION: CPT | Performed by: OTOLARYNGOLOGY

## 2025-02-25 RX ORDER — PREDNISONE 20 MG/1
TABLET ORAL
Qty: 21 TABLET | Refills: 0 | Status: SHIPPED | OUTPATIENT
Start: 2025-02-25

## 2025-02-25 RX ORDER — PSEUDOEPHEDRINE HCL 120 MG/1
120 TABLET, FILM COATED, EXTENDED RELEASE ORAL EVERY 12 HOURS
Qty: 14 TABLET | Refills: 0 | Status: SHIPPED | OUTPATIENT
Start: 2025-02-25 | End: 2025-03-04

## 2025-02-25 NOTE — PROGRESS NOTES
2025    Aline Martell (:  1995) is a 29 y.o. female, Established patient, here for evaluation of the following chief complaint(s):  Follow-up (Ear pain)      Vitals:    25 1324   BP: 124/72   Weight: 100.7 kg (222 lb)   Height: 1.626 m (5' 4\")       Wt Readings from Last 3 Encounters:   25 100.7 kg (222 lb)   24 101.2 kg (223 lb)   24 97.5 kg (215 lb)       BP Readings from Last 3 Encounters:   25 124/72   24 120/82   24 124/86         SUBJECTIVE/OBJECTIVE:    Patient seen today for her ears.  At the last visit I performed a myringotomy on the right and she said it was quite painful and the pain lasted quite some time but now it is gone but she feels like the ears are going to explode when she swallows or sneezes.  Left ear not quite as bad.  She says the Astelin does help but she does not use it every day.        Review of Systems   HENT:          Ear pressure        Physical Exam  Vitals reviewed.   Constitutional:       Appearance: Normal appearance. She is normal weight.   HENT:      Head: Normocephalic and atraumatic.      Right Ear: Tympanic membrane, ear canal and external ear normal.      Left Ear: Tympanic membrane, ear canal and external ear normal.      Nose: Nose normal.      Mouth/Throat:      Mouth: Mucous membranes are moist.      Pharynx: Oropharynx is clear.   Eyes:      Extraocular Movements: Extraocular movements intact.      Pupils: Pupils are equal, round, and reactive to light.   Cardiovascular:      Rate and Rhythm: Normal rate and regular rhythm.   Pulmonary:      Effort: Pulmonary effort is normal.      Breath sounds: Normal breath sounds.   Musculoskeletal:      Cervical back: Normal range of motion.   Skin:     General: Skin is warm and dry.   Neurological:      General: No focal deficit present.      Mental Status: She is alert and oriented to person, place, and time.   Psychiatric:         Mood and Affect: Mood normal.

## 2025-03-25 ENCOUNTER — TELEPHONE (OUTPATIENT)
Dept: ENT CLINIC | Age: 30
End: 2025-03-25

## 2025-03-25 NOTE — TELEPHONE ENCOUNTER
Aline requests that a nurse  return their call. Pt would like to know if she should keep her appt tomorrow to follow up on ears. Per pt has symptoms of sinus infection. Please call pt advise. The best time to reach her is Anytime.     Thank you.

## 2025-03-26 ENCOUNTER — OFFICE VISIT (OUTPATIENT)
Dept: ENT CLINIC | Age: 30
End: 2025-03-26
Payer: COMMERCIAL

## 2025-03-26 VITALS
SYSTOLIC BLOOD PRESSURE: 124 MMHG | DIASTOLIC BLOOD PRESSURE: 76 MMHG | HEIGHT: 64 IN | BODY MASS INDEX: 37.9 KG/M2 | WEIGHT: 222 LBS

## 2025-03-26 DIAGNOSIS — H93.8X3 PRESSURE SENSATION IN BOTH EARS: Primary | ICD-10-CM

## 2025-03-26 DIAGNOSIS — J01.91 ACUTE RECURRENT SINUSITIS, UNSPECIFIED LOCATION: ICD-10-CM

## 2025-03-26 PROCEDURE — G8427 DOCREV CUR MEDS BY ELIG CLIN: HCPCS | Performed by: OTOLARYNGOLOGY

## 2025-03-26 PROCEDURE — 99214 OFFICE O/P EST MOD 30 MIN: CPT | Performed by: OTOLARYNGOLOGY

## 2025-03-26 PROCEDURE — G8417 CALC BMI ABV UP PARAM F/U: HCPCS | Performed by: OTOLARYNGOLOGY

## 2025-03-26 PROCEDURE — 1036F TOBACCO NON-USER: CPT | Performed by: OTOLARYNGOLOGY

## 2025-03-26 RX ORDER — PREDNISONE 20 MG/1
TABLET ORAL
Qty: 21 TABLET | Refills: 0 | Status: SHIPPED | OUTPATIENT
Start: 2025-03-26

## 2025-03-26 ASSESSMENT — ENCOUNTER SYMPTOMS
SINUS PRESSURE: 1
EYES NEGATIVE: 1
GASTROINTESTINAL NEGATIVE: 1
ALLERGIC/IMMUNOLOGIC NEGATIVE: 1
RESPIRATORY NEGATIVE: 1
SINUS PAIN: 1

## 2025-03-26 NOTE — PROGRESS NOTES
3/26/2025    Aline Martell (:  1995) is a 30 y.o. female, Established patient, here for evaluation of the following chief complaint(s):  Follow-up (Allergies)      Vitals:    25 1319   BP: 124/76   Weight: 100.7 kg (222 lb)   Height: 1.626 m (5' 4\")       Wt Readings from Last 3 Encounters:   25 100.7 kg (222 lb)   25 100.7 kg (222 lb)   24 101.2 kg (223 lb)       BP Readings from Last 3 Encounters:   25 124/76   25 124/72   24 120/82         SUBJECTIVE/OBJECTIVE:    Patient seen today for sinus infection.  We have been treating her ears and she was doing better but then developed sinus pressure and ear pressure pain and significant congestion.  She went to urgent care they ordered antibiotics but the primary pharmacy did not have it at first.        Review of Systems   Constitutional: Negative.    HENT:  Positive for congestion, sinus pressure and sinus pain.         Ear pressure   Eyes: Negative.    Respiratory: Negative.     Cardiovascular: Negative.    Gastrointestinal: Negative.    Endocrine: Negative.    Musculoskeletal: Negative.    Skin: Negative.    Allergic/Immunologic: Negative.    Neurological: Negative.    Hematological: Negative.    Psychiatric/Behavioral: Negative.          Physical Exam  Vitals reviewed.   Constitutional:       Appearance: Normal appearance. She is normal weight.   HENT:      Head: Normocephalic and atraumatic.      Right Ear: Tympanic membrane, ear canal and external ear normal.      Left Ear: Tympanic membrane, ear canal and external ear normal.      Nose: Nose normal.      Mouth/Throat:      Mouth: Mucous membranes are moist.      Pharynx: Oropharynx is clear.   Eyes:      Extraocular Movements: Extraocular movements intact.      Pupils: Pupils are equal, round, and reactive to light.   Cardiovascular:      Rate and Rhythm: Normal rate and regular rhythm.   Pulmonary:      Effort: Pulmonary effort is normal.      Breath

## 2025-05-28 ENCOUNTER — TELEPHONE (OUTPATIENT)
Dept: CARDIOLOGY | Facility: CLINIC | Age: 30
End: 2025-05-28

## 2025-05-28 NOTE — TELEPHONE ENCOUNTER
Caller: Tati Goldstein    Relationship to patient: Self    Best call back number: [phone]    Chief complaint: YEARLY FU    Type of visit: YEARLY FU    Requested date: ANYTIME/DATE     Additional notes:PT CALLED IN TO SCHEDULE YEARLY FU, LAST OV NOTE DOES NOT HAVE ANY FU DIRECTIONS. PLEASE ADVISE WHEN TO SCHEDULE PT AND CALL BACK TO OFFER AN APPT.

## 2025-06-10 ENCOUNTER — OFFICE VISIT (OUTPATIENT)
Dept: ENT CLINIC | Age: 30
End: 2025-06-10
Payer: COMMERCIAL

## 2025-06-10 ENCOUNTER — PREP FOR PROCEDURE (OUTPATIENT)
Dept: ENT CLINIC | Age: 30
End: 2025-06-10

## 2025-06-10 VITALS
DIASTOLIC BLOOD PRESSURE: 70 MMHG | BODY MASS INDEX: 37.05 KG/M2 | WEIGHT: 217 LBS | SYSTOLIC BLOOD PRESSURE: 112 MMHG | HEIGHT: 64 IN

## 2025-06-10 DIAGNOSIS — H93.8X3 PRESSURE SENSATION IN EAR, BILATERAL: ICD-10-CM

## 2025-06-10 DIAGNOSIS — H93.8X3 PRESSURE SENSATION IN BOTH EARS: Primary | ICD-10-CM

## 2025-06-10 DIAGNOSIS — Z91.09 ENVIRONMENTAL ALLERGIES: ICD-10-CM

## 2025-06-10 PROCEDURE — 99214 OFFICE O/P EST MOD 30 MIN: CPT | Performed by: OTOLARYNGOLOGY

## 2025-06-10 ASSESSMENT — ENCOUNTER SYMPTOMS
ALLERGIC/IMMUNOLOGIC NEGATIVE: 1
RESPIRATORY NEGATIVE: 1
GASTROINTESTINAL NEGATIVE: 1
EYES NEGATIVE: 1

## 2025-06-10 NOTE — PROGRESS NOTES
warm and dry.   Neurological:      General: No focal deficit present.      Mental Status: She is alert and oriented to person, place, and time.   Psychiatric:         Mood and Affect: Mood normal.         Behavior: Behavior normal.              ASSESSMENT/PLAN:    1. Pressure sensation in both ears  2. Environmental allergies  Continues to be symptomatic and she says she is does not want any more steroids.  I think the neck step can be balloon dilation and ear tubes.  Risk and benefits discussed and she would like to proceed.    Return in about 14 weeks (around 9/16/2025).    An electronic signature was used to authenticate this note.    Alexis Ferrera MD       Please note that this chart was generated using dragon dictation software.  Although every effort was made to ensure the accuracy of this automated transcription, some errors in transcription may have occurred.

## 2025-06-18 ENCOUNTER — OFFICE VISIT (OUTPATIENT)
Dept: CARDIOLOGY | Facility: CLINIC | Age: 30
End: 2025-06-18
Payer: COMMERCIAL

## 2025-06-18 VITALS
OXYGEN SATURATION: 98 % | WEIGHT: 216 LBS | BODY MASS INDEX: 36.88 KG/M2 | SYSTOLIC BLOOD PRESSURE: 112 MMHG | HEART RATE: 89 BPM | HEIGHT: 64 IN | DIASTOLIC BLOOD PRESSURE: 76 MMHG

## 2025-06-18 DIAGNOSIS — I25.3 ATRIAL SEPTAL ANEURYSM: ICD-10-CM

## 2025-06-18 DIAGNOSIS — E78.2 MIXED HYPERLIPIDEMIA: ICD-10-CM

## 2025-06-18 DIAGNOSIS — Q21.12 PATENT FORAMEN OVALE: Primary | ICD-10-CM

## 2025-06-18 PROCEDURE — 99214 OFFICE O/P EST MOD 30 MIN: CPT | Performed by: INTERNAL MEDICINE

## 2025-06-18 NOTE — PROGRESS NOTES
Reason for Visit: cardiovascular follow up.    HPI:  Tati Goldstein is a 30 y.o. female is here today for 1 year follow-up.  She is feeling well and not having any issues or complaints.  She denies any chest pain, palpitations, dizziness, syncope, PND, or orthopnea.  Working towards eating healthier and trying to make better choices.  She would like to avoid any additional medications if at all possible.  Recent blood work showed evidence of prediabetes and mixed hyperlipidemia.    Previous Cardiac Testing and Procedures:  - KIRSTEN (05/01/2015) EF 60%, trivial MR, normal RV size and function, small PFO with small right-to-left atrial shunt, large atrial septal aneurysm, mild TR  - Holter monitor (05/04/2016) sinus rhythm with rare atrial ectopic beats with very short runs of SVT, patient events associated with sinus rhythm  - Echo (05/04/2016) EF 60%, trivial MR, mild TR, normal RV size and function  - Echo (9/6/2016) EF 70%, mild PI and TR, normal RV size and function  - Echo (6/28/2018) EF 60-65%, normal diastolic function, normal RV size and function, no significant valve dysfunction, no atrial level shunt visualized  - Holter monitor (7/16/2020) sinus rhythm with rare ventricular ectopic beats, no significant pauses, redness, or events, as patient symptoms associated with sinus rhythm, normal monitor study  - Echo (8/19/2021) EF 60-65%, normal diastolic function, normal RV size and function, normal valves  - Echo (6/17/2024) EF 55-60%, normal diastolic function, normal RV size and function, no significant valve dysfunction     Lab data:  - Lipid panel (8/5/2022) total cholesterol 216, HDL 50, , triglycerides 212  - NT proBNP (7/28/2023) 13, normal 0-125  - Hemoglobin A1c (10/23/2024) 5.8%  - TSH (10/23/2024) 0.683  - Lipid panel (10/23/2024) total cholesterol 223, HDL 51, , triglycerides 181  - BMP (10/23/2024) creatinine 0.66, potassium 4.3, sodium 138, glucose 110    Patient Active Problem List    Diagnosis    Migraine    GERD (gastroesophageal reflux disease)    Class 2 obesity due to excess calories without serious comorbidity with body mass index (BMI) of 37.0 to 37.9 in adult    Patent foramen ovale    Atrial septal aneurysm    Mixed hyperlipidemia       Social History     Tobacco Use    Smoking status: Former     Current packs/day: 0.50     Average packs/day: 0.4 packs/day for 2.0 years (0.8 ttl pk-yrs)     Types: Cigarettes, Electronic Cigarette    Smokeless tobacco: Never   Vaping Use    Vaping status: Never Used   Substance Use Topics    Alcohol use: Never    Drug use: Never       Family History   Problem Relation Age of Onset    Heart disease Maternal Grandmother     Cancer Maternal Grandmother         Follicular Lymphoma    Heart disease Maternal Grandfather     Diabetes Maternal Grandfather         Type 1    Hypertension Maternal Grandfather     Heart disease Paternal Grandmother     Diabetes Paternal Grandmother         Type 2    Hyperlipidemia Paternal Grandmother     Hypertension Paternal Grandmother     Stroke Paternal Grandmother     Clotting disorder Paternal Grandmother     Heart attack Paternal Grandmother     Migraines Paternal Grandmother     Heart disease Paternal Grandfather     Cancer Paternal Grandfather         Skin cancer    Migraines Paternal Grandfather     Breast cancer Other     Brain cancer Other     Diabetes Mother         Type 1    Hyperlipidemia Mother     Hypertension Mother     Stroke Mother     Neuropathy Mother     Asthma Daughter         Mild intermittent asthma    Diabetes Maternal Aunt         Type 1    Hyperlipidemia Father     Migraines Father     Migraines Sister        The following portions of the patient's history were reviewed and updated as appropriate: allergies, current medications, past family history, past medical history, past social history, past surgical history, and problem list.      Current Outpatient Medications:     Azelastine HCl 137 MCG/SPRAY  "solution, USE 1 SPRAY(S) IN EACH NOSTRIL TWICE DAILY AS DIRECTED, Disp: , Rfl:     fexofenadine (ALLEGRA) 180 MG tablet, Take 1 tablet by mouth Daily., Disp: , Rfl:     EPINEPHrine (EPIPEN) 0.3 MG/0.3ML solution auto-injector injection, INJECT CONTENTS OF 1 PEN AS NEEDED FOR ALLERGIC REACTION (Patient not taking: Reported on 5/29/2024), Disp: , Rfl:     Review of Systems   Constitutional: Negative for chills and fever.   Cardiovascular:  Negative for chest pain and paroxysmal nocturnal dyspnea.   Respiratory:  Negative for cough and shortness of breath.    Skin:  Negative for rash.   Gastrointestinal:  Negative for abdominal pain and heartburn.   Neurological:  Negative for dizziness and numbness.       Objective   /76 (BP Location: Left arm, Patient Position: Sitting, Cuff Size: Adult)   Pulse 89   Ht 161.9 cm (63.74\")   Wt 98 kg (216 lb)   SpO2 98%   BMI 37.38 kg/m²   Constitutional:       Appearance: Well-developed. Obese.   HENT:      Head: Normocephalic and atraumatic.   Pulmonary:      Effort: Pulmonary effort is normal.      Breath sounds: Normal breath sounds.   Cardiovascular:      Normal rate. Regular rhythm.   Edema:     Peripheral edema absent.   Skin:     General: Skin is warm and dry.   Neurological:      Mental Status: Alert and oriented to person, place, and time.       Procedures      ICD-10-CM ICD-9-CM   1. Patent foramen ovale  Q21.12 745.5   2. Atrial septal aneurysm  I25.3 414.10   3. Mixed hyperlipidemia  E78.2 272.2         Assessment/Plan:  1.  Patent foramen ovale: Diagnosed on KIRTSEN, 5/2015.  There remains no indication for closure.     2.  Atrial septal aneurysm: Noted on previous KIRSTEN in 2015, but no evidence of significant aneurysm on more recent echos.      3.  Mixed hyperlipidemia: Lipid panel on 10/23/2024 showed elevated total and LDL cholesterol and elevated triglycerides.  HDL was normal.  Patient is not in the statin benefit group.  Recommend continued lifestyle " modification.

## 2025-07-28 ENCOUNTER — HOSPITAL ENCOUNTER (OUTPATIENT)
Dept: PREADMISSION TESTING | Age: 30
Discharge: HOME | End: 2025-08-01
Payer: COMMERCIAL

## 2025-07-28 VITALS — HEIGHT: 64 IN | BODY MASS INDEX: 36.37 KG/M2 | WEIGHT: 213 LBS

## 2025-07-28 LAB
ANION GAP SERPL CALCULATED.3IONS-SCNC: 14 MMOL/L (ref 8–16)
BUN SERPL-MCNC: 11 MG/DL (ref 6–20)
CALCIUM SERPL-MCNC: 9.5 MG/DL (ref 8.6–10)
CHLORIDE SERPL-SCNC: 103 MMOL/L (ref 98–107)
CO2 SERPL-SCNC: 21 MMOL/L (ref 22–29)
CREAT SERPL-MCNC: 0.8 MG/DL (ref 0.5–0.9)
ERYTHROCYTE [DISTWIDTH] IN BLOOD BY AUTOMATED COUNT: 12.3 % (ref 11.5–14.5)
GLUCOSE SERPL-MCNC: 104 MG/DL (ref 70–99)
HCT VFR BLD AUTO: 37.4 % (ref 37–47)
HGB BLD-MCNC: 12.9 G/DL (ref 12–16)
MCH RBC QN AUTO: 31.4 PG (ref 27–31)
MCHC RBC AUTO-ENTMCNC: 34.5 G/DL (ref 33–37)
MCV RBC AUTO: 91 FL (ref 81–99)
PLATELET # BLD AUTO: 387 K/UL (ref 130–400)
PMV BLD AUTO: 10.6 FL (ref 9.4–12.3)
POTASSIUM SERPL-SCNC: 4.1 MMOL/L (ref 3.5–5.1)
RBC # BLD AUTO: 4.11 M/UL (ref 4.2–5.4)
SODIUM SERPL-SCNC: 138 MMOL/L (ref 136–145)
WBC # BLD AUTO: 10.2 K/UL (ref 4.8–10.8)

## 2025-07-28 PROCEDURE — 85027 COMPLETE CBC AUTOMATED: CPT

## 2025-07-28 PROCEDURE — 80048 BASIC METABOLIC PNL TOTAL CA: CPT

## 2025-07-28 NOTE — DISCHARGE INSTRUCTIONS
The day before surgery you will receive a phone call from the surgery nurse to let you know what time to arrive on the day of surgery. This call will usually be between 1:00 and 4 PM. If you do not receive a phone call by 4 PM the day before your surgery please call [phone] and let them know you have not received an arrival time. If your surgery is on Monday, your call will be on the Friday before your Monday surgery. Please check your voicemail as they may leave a message with that information.  If you are running late or wake up sick the day of surgery please call the above number for further instructions.    PREOPERATIVE GUIDELINES WHEN RECEIVING ANESTHESIA    Do not eat anything after midnight the night before your surgery. You may have water up to 2 hours before your arrival time. No gum or candy the morning of surgery.  This is extremely important for your safety.    Take a bath (or shower) the night before your surgery and you may brush your teeth the morning of your surgery.    Morning of surgery no Nicotine products including smoking, vaping, patches, dip or snuff.     You will be scheduled to arrive at the hospital 2 hours before your surgery, or follow your surgeon's instructions.    Dress comfortably.  Wear loose clothing that will be easy to remove and comfortable for your trip home.    You may wear eyeglasses but bring your cases with you as they must be remove before your surgery. If you wear contacts they will have to be removed before your surgery.    Hearing aids and dentures will need to be removed before your surgery. If you wear dentures, do not glue them in the morning of surgery.     Do not wear any jewelry, including body jewelry.  All jewelry will need to be removed prior to your surgery. This includes wedding rings. Any metal touching your body can cause a burn or may have to be cut off due to swelling or injury.    Do not wear fingernail polish or make-up.    It is best not to bring

## 2025-07-31 DIAGNOSIS — G89.18 POSTOPERATIVE PAIN: Primary | ICD-10-CM

## 2025-07-31 RX ORDER — OXYCODONE AND ACETAMINOPHEN 5; 325 MG/1; MG/1
1 TABLET ORAL EVERY 6 HOURS PRN
Qty: 12 TABLET | Refills: 0 | Status: SHIPPED | OUTPATIENT
Start: 2025-07-31 | End: 2025-08-03

## 2025-07-31 RX ORDER — OFLOXACIN 3 MG/ML
5 SOLUTION AURICULAR (OTIC) 2 TIMES DAILY
Qty: 10 ML | Refills: 0 | Status: SHIPPED | OUTPATIENT
Start: 2025-07-31 | End: 2025-08-10

## 2025-07-31 ASSESSMENT — ENCOUNTER SYMPTOMS
EYES NEGATIVE: 1
RESPIRATORY NEGATIVE: 1
ALLERGIC/IMMUNOLOGIC NEGATIVE: 1
GASTROINTESTINAL NEGATIVE: 1

## 2025-08-01 ENCOUNTER — HOSPITAL ENCOUNTER (OUTPATIENT)
Age: 30
Setting detail: OUTPATIENT SURGERY
Discharge: HOME | End: 2025-08-01
Attending: OTOLARYNGOLOGY | Admitting: OTOLARYNGOLOGY
Payer: COMMERCIAL

## 2025-08-01 ENCOUNTER — ANESTHESIA EVENT (OUTPATIENT)
Dept: OPERATING ROOM | Age: 30
End: 2025-08-01
Payer: COMMERCIAL

## 2025-08-01 ENCOUNTER — ANESTHESIA (OUTPATIENT)
Dept: OPERATING ROOM | Age: 30
End: 2025-08-01
Payer: COMMERCIAL

## 2025-08-01 VITALS
HEART RATE: 70 BPM | TEMPERATURE: 97.5 F | DIASTOLIC BLOOD PRESSURE: 70 MMHG | OXYGEN SATURATION: 100 % | RESPIRATION RATE: 16 BRPM | SYSTOLIC BLOOD PRESSURE: 122 MMHG

## 2025-08-01 PROCEDURE — 6370000000 HC RX 637 (ALT 250 FOR IP): Performed by: OTOLARYNGOLOGY

## 2025-08-01 PROCEDURE — 69706 NPS SURG DILAT EUST TUBE BI: CPT | Performed by: OTOLARYNGOLOGY

## 2025-08-01 PROCEDURE — 6360000002 HC RX W HCPCS: Performed by: NURSE ANESTHETIST, CERTIFIED REGISTERED

## 2025-08-01 PROCEDURE — 69436 CREATE EARDRUM OPENING: CPT | Performed by: OTOLARYNGOLOGY

## 2025-08-01 PROCEDURE — 7100000011 HC PHASE II RECOVERY - ADDTL 15 MIN: Performed by: OTOLARYNGOLOGY

## 2025-08-01 PROCEDURE — 3700000001 HC ADD 15 MINUTES (ANESTHESIA): Performed by: OTOLARYNGOLOGY

## 2025-08-01 PROCEDURE — L8699 PROSTHETIC IMPLANT NOS: HCPCS | Performed by: OTOLARYNGOLOGY

## 2025-08-01 PROCEDURE — 3600000002 HC SURGERY LEVEL 2 BASE: Performed by: OTOLARYNGOLOGY

## 2025-08-01 PROCEDURE — 7100000010 HC PHASE II RECOVERY - FIRST 15 MIN: Performed by: OTOLARYNGOLOGY

## 2025-08-01 PROCEDURE — 2709999900 HC NON-CHARGEABLE SUPPLY: Performed by: OTOLARYNGOLOGY

## 2025-08-01 PROCEDURE — 6360000002 HC RX W HCPCS: Performed by: ANESTHESIOLOGY

## 2025-08-01 PROCEDURE — 3600000012 HC SURGERY LEVEL 2 ADDTL 15MIN: Performed by: OTOLARYNGOLOGY

## 2025-08-01 PROCEDURE — 7100000000 HC PACU RECOVERY - FIRST 15 MIN: Performed by: OTOLARYNGOLOGY

## 2025-08-01 PROCEDURE — 6370000000 HC RX 637 (ALT 250 FOR IP): Performed by: ANESTHESIOLOGY

## 2025-08-01 PROCEDURE — 2580000003 HC RX 258: Performed by: NURSE ANESTHETIST, CERTIFIED REGISTERED

## 2025-08-01 PROCEDURE — 3700000000 HC ANESTHESIA ATTENDED CARE: Performed by: OTOLARYNGOLOGY

## 2025-08-01 PROCEDURE — 7100000001 HC PACU RECOVERY - ADDTL 15 MIN: Performed by: OTOLARYNGOLOGY

## 2025-08-01 PROCEDURE — C1726 CATH, BAL DIL, NON-VASCULAR: HCPCS | Performed by: OTOLARYNGOLOGY

## 2025-08-01 DEVICE — IMPLANTABLE DEVICE: Type: IMPLANTABLE DEVICE | Status: FUNCTIONAL

## 2025-08-01 RX ORDER — SODIUM CHLORIDE 9 MG/ML
INJECTION, SOLUTION INTRAVENOUS PRN
Status: DISCONTINUED | OUTPATIENT
Start: 2025-08-01 | End: 2025-08-01 | Stop reason: HOSPADM

## 2025-08-01 RX ORDER — SODIUM CHLORIDE 0.9 % (FLUSH) 0.9 %
5-40 SYRINGE (ML) INJECTION PRN
Status: DISCONTINUED | OUTPATIENT
Start: 2025-08-01 | End: 2025-08-01 | Stop reason: HOSPADM

## 2025-08-01 RX ORDER — HYDROMORPHONE HYDROCHLORIDE 1 MG/ML
0.5 INJECTION, SOLUTION INTRAMUSCULAR; INTRAVENOUS; SUBCUTANEOUS EVERY 5 MIN PRN
Status: DISCONTINUED | OUTPATIENT
Start: 2025-08-01 | End: 2025-08-01 | Stop reason: HOSPADM

## 2025-08-01 RX ORDER — OXYMETAZOLINE HYDROCHLORIDE 0.05 G/100ML
SPRAY NASAL PRN
Status: DISCONTINUED | OUTPATIENT
Start: 2025-08-01 | End: 2025-08-01 | Stop reason: ALTCHOICE

## 2025-08-01 RX ORDER — FAMOTIDINE 20 MG/1
20 TABLET, FILM COATED ORAL ONCE
Status: COMPLETED | OUTPATIENT
Start: 2025-08-01 | End: 2025-08-01

## 2025-08-01 RX ORDER — APREPITANT 40 MG/1
40 CAPSULE ORAL ONCE
Status: COMPLETED | OUTPATIENT
Start: 2025-08-01 | End: 2025-08-01

## 2025-08-01 RX ORDER — LABETALOL HYDROCHLORIDE 5 MG/ML
10 INJECTION, SOLUTION INTRAVENOUS
Status: DISCONTINUED | OUTPATIENT
Start: 2025-08-01 | End: 2025-08-01 | Stop reason: HOSPADM

## 2025-08-01 RX ORDER — CIPROFLOXACIN AND DEXAMETHASONE 3; 1 MG/ML; MG/ML
SUSPENSION/ DROPS AURICULAR (OTIC) PRN
Status: DISCONTINUED | OUTPATIENT
Start: 2025-08-01 | End: 2025-08-01 | Stop reason: ALTCHOICE

## 2025-08-01 RX ORDER — SODIUM CHLORIDE 0.9 % (FLUSH) 0.9 %
5-40 SYRINGE (ML) INJECTION EVERY 12 HOURS SCHEDULED
Status: DISCONTINUED | OUTPATIENT
Start: 2025-08-01 | End: 2025-08-01 | Stop reason: HOSPADM

## 2025-08-01 RX ORDER — LIDOCAINE HYDROCHLORIDE 10 MG/ML
1 INJECTION, SOLUTION EPIDURAL; INFILTRATION; INTRACAUDAL; PERINEURAL
Status: DISCONTINUED | OUTPATIENT
Start: 2025-08-01 | End: 2025-08-01 | Stop reason: HOSPADM

## 2025-08-01 RX ORDER — PROMETHAZINE HYDROCHLORIDE 25 MG/1
25 TABLET ORAL ONCE
Status: COMPLETED | OUTPATIENT
Start: 2025-08-01 | End: 2025-08-01

## 2025-08-01 RX ORDER — SODIUM CHLORIDE, SODIUM LACTATE, POTASSIUM CHLORIDE, CALCIUM CHLORIDE 600; 310; 30; 20 MG/100ML; MG/100ML; MG/100ML; MG/100ML
INJECTION, SOLUTION INTRAVENOUS
Status: DISCONTINUED | OUTPATIENT
Start: 2025-08-01 | End: 2025-08-01 | Stop reason: SDUPTHER

## 2025-08-01 RX ORDER — SCOPOLAMINE 1 MG/3D
1 PATCH, EXTENDED RELEASE TRANSDERMAL
Status: DISCONTINUED | OUTPATIENT
Start: 2025-08-01 | End: 2025-08-01 | Stop reason: HOSPADM

## 2025-08-01 RX ORDER — HYDROMORPHONE HYDROCHLORIDE 1 MG/ML
0.25 INJECTION, SOLUTION INTRAMUSCULAR; INTRAVENOUS; SUBCUTANEOUS EVERY 5 MIN PRN
Status: DISCONTINUED | OUTPATIENT
Start: 2025-08-01 | End: 2025-08-01 | Stop reason: HOSPADM

## 2025-08-01 RX ORDER — IPRATROPIUM BROMIDE AND ALBUTEROL SULFATE 2.5; .5 MG/3ML; MG/3ML
1 SOLUTION RESPIRATORY (INHALATION)
Status: DISCONTINUED | OUTPATIENT
Start: 2025-08-01 | End: 2025-08-01 | Stop reason: HOSPADM

## 2025-08-01 RX ORDER — PROPOFOL 10 MG/ML
INJECTION, EMULSION INTRAVENOUS
Status: DISCONTINUED | OUTPATIENT
Start: 2025-08-01 | End: 2025-08-01 | Stop reason: SDUPTHER

## 2025-08-01 RX ORDER — DROPERIDOL 2.5 MG/ML
0.62 INJECTION, SOLUTION INTRAMUSCULAR; INTRAVENOUS
Status: DISCONTINUED | OUTPATIENT
Start: 2025-08-01 | End: 2025-08-01 | Stop reason: HOSPADM

## 2025-08-01 RX ORDER — ONDANSETRON 2 MG/ML
INJECTION INTRAMUSCULAR; INTRAVENOUS
Status: DISCONTINUED | OUTPATIENT
Start: 2025-08-01 | End: 2025-08-01 | Stop reason: SDUPTHER

## 2025-08-01 RX ORDER — DIPHENHYDRAMINE HYDROCHLORIDE 50 MG/ML
12.5 INJECTION, SOLUTION INTRAMUSCULAR; INTRAVENOUS
Status: DISCONTINUED | OUTPATIENT
Start: 2025-08-01 | End: 2025-08-01 | Stop reason: HOSPADM

## 2025-08-01 RX ORDER — DEXAMETHASONE SODIUM PHOSPHATE 10 MG/ML
INJECTION, SOLUTION INTRAMUSCULAR; INTRAVENOUS
Status: DISCONTINUED | OUTPATIENT
Start: 2025-08-01 | End: 2025-08-01 | Stop reason: SDUPTHER

## 2025-08-01 RX ORDER — LIDOCAINE HYDROCHLORIDE 10 MG/ML
INJECTION, SOLUTION EPIDURAL; INFILTRATION; INTRACAUDAL; PERINEURAL
Status: DISCONTINUED | OUTPATIENT
Start: 2025-08-01 | End: 2025-08-01 | Stop reason: SDUPTHER

## 2025-08-01 RX ORDER — HYDRALAZINE HYDROCHLORIDE 20 MG/ML
10 INJECTION INTRAMUSCULAR; INTRAVENOUS
Status: DISCONTINUED | OUTPATIENT
Start: 2025-08-01 | End: 2025-08-01 | Stop reason: HOSPADM

## 2025-08-01 RX ADMIN — SODIUM CHLORIDE, SODIUM LACTATE, POTASSIUM CHLORIDE, AND CALCIUM CHLORIDE: 600; 310; 30; 20 INJECTION, SOLUTION INTRAVENOUS at 07:25

## 2025-08-01 RX ADMIN — LIDOCAINE HYDROCHLORIDE 50 MG: 10 INJECTION, SOLUTION EPIDURAL; INFILTRATION; INTRACAUDAL; PERINEURAL at 07:29

## 2025-08-01 RX ADMIN — FAMOTIDINE 20 MG: 20 TABLET, FILM COATED ORAL at 07:10

## 2025-08-01 RX ADMIN — PROPOFOL 150 MG: 10 INJECTION, EMULSION INTRAVENOUS at 07:29

## 2025-08-01 RX ADMIN — PROMETHAZINE HYDROCHLORIDE 25 MG: 25 TABLET ORAL at 07:10

## 2025-08-01 RX ADMIN — DEXAMETHASONE SODIUM PHOSPHATE 10 MG: 10 INJECTION, SOLUTION INTRAMUSCULAR; INTRAVENOUS at 07:30

## 2025-08-01 RX ADMIN — ONDANSETRON 4 MG: 2 INJECTION INTRAMUSCULAR; INTRAVENOUS at 07:30

## 2025-08-01 RX ADMIN — APREPITANT 40 MG: 40 CAPSULE ORAL at 07:10

## 2025-08-01 ASSESSMENT — PAIN - FUNCTIONAL ASSESSMENT
PAIN_FUNCTIONAL_ASSESSMENT: NONE - DENIES PAIN
PAIN_FUNCTIONAL_ASSESSMENT: 0-10

## 2025-08-01 ASSESSMENT — PAIN DESCRIPTION - DESCRIPTORS: DESCRIPTORS: SORE

## 2025-08-01 ASSESSMENT — LIFESTYLE VARIABLES: SMOKING_STATUS: 1

## 2025-08-01 NOTE — ANESTHESIA PRE PROCEDURE
Department of Anesthesiology  Preprocedure Note       Name:  Aline Martell   Age:  30 y.o.  :  1995                                          MRN:  [id number]         Date:  2025      Surgeon: Surgeon(s):  Alexis Ferrera MD    Procedure: Procedure(s):  Balloon dilation  Myringotomy tube    Medications prior to admission:   Prior to Admission medications    Medication Sig Start Date End Date Taking? Authorizing Provider   fexofenadine (ALLEGRA) 180 MG tablet Take 1 tablet by mouth daily 24  Yes Alexis Ferrera MD   azelastine (ASTELIN) 0.1 % nasal spray 1 spray by Nasal route 2 times daily Use in each nostril as directed 24  Yes Bettye Durand APRN - CNP   ofloxacin (FLOXIN) 0.3 % otic solution Place 5 drops into both ears 2 times daily for 10 days  Patient not taking: Reported on 2025 7/31/25 8/10/25  Alexis Ferrera MD   oxyCODONE-acetaminophen (ENDOCET) 5-325 MG per tablet Take 1 tablet by mouth every 6 hours as needed for Pain for up to 3 days. Intended supply: 3 days. Take lowest dose possible to manage pain Max Daily Amount: 4 tablets  Patient not taking: Reported on 2025 7/31/25 8/3/25  Alexis Ferrera MD       Current medications:    No current facility-administered medications for this encounter.       Allergies:    Allergies   Allergen Reactions   • Fentanyl      Violently shaking, spent hours in recovery       Problem List:    Patient Active Problem List   Diagnosis Code   • Bilateral nonpulsatile tinnitus H93.13   • Pressure sensation in ear, bilateral H93.8X3   • Environmental allergies Z91.09       Past Medical History:        Diagnosis Date   • Atrial aneurysm    • Atrial septal defect    • PONV (postoperative nausea and vomiting)        Past Surgical History:        Procedure Laterality Date   • CHOLECYSTECTOMY     • HYSTERECTOMY (CERVIX STATUS UNKNOWN)     • TUBAL LIGATION Bilateral        Social History:    Social History     Tobacco Use   • Smoking status:

## 2025-08-01 NOTE — OP NOTE
Operative Note      Patient: Aline Martell  YOB: 1995  MRN: [id number]    Date of Procedure: 8/1/2025    Pre-Op Diagnosis Codes:      * Pressure sensation in ear, bilateral [H93.8X3]     * Environmental allergies [Z91.09]    Post-Op Diagnosis: Same       Procedure(s):  Balloon dilation  Myringotomy tube    Surgeon(s):  Alexis Ferrera MD    Assistant:   * No surgical staff found *    Anesthesia: Choice    Estimated Blood Loss (mL): Minimal    Complications: None    Specimens:   * No specimens in log *    Implants:  Implant Name Type Inv. Item Serial No.  Lot No. LRB No. Used Action   TUBE EAR VENT APPIAH GROM 1.14 MM FLUROPLAST BLUE STERILE - [id number]  TUBE EAR VENT APPIAH GROM 1.14 MM FLUROPLAST BLUE STERILE  [employer]WD [id number] Left 1 Implanted   TUBE EAR VENT APPIAH GROM 1.14 MM FLUROPLAST BLUE STERILE - [id number]  TUBE EAR VENT APPIAH GROM 1.14 MM FLUROPLAST BLUE STERILE  [employer]WD [id number] Right 1 Implanted         Drains: * No LDAs found *    Findings:  Present At Time Of Surgery (PATOS) (choose all levels that have infection present):  No infection present  Other Findings: Clear middle ears clear nasopharynx    Detailed Description of Procedure:   After obtaining full consent, the patient was taken to the operative room and placed operative spine position.  After the induction of general LMA anesthesia the patient was prepped in standard fashion for ear tubes and balloon dilation.  Once a timeout took effect Afrin-soaked pledgets were placed bilaterally in the nose.  The operating microscope was then used to examine the right ear.  The TM was visualized and a radial incision made to the anterior-inferior quadrant.  A tube was indirectly placed and drops were applied.  Left ear was addressed similar fashion.  Next the right nasal cavity was inspected with a 30 degree endoscope.  The open eustachian tube was identified and a balloon was inserted and

## 2025-08-01 NOTE — ANESTHESIA POSTPROCEDURE EVALUATION
Department of Anesthesiology  Postprocedure Note    Patient: Aline Martell  MRN: [id number]  YOB: 1995  Date of evaluation: 8/1/2025    Procedure Summary       Date: 08/01/25 Room / Location: [address]    Anesthesia Start: 0726 Anesthesia Stop: 0748    Procedures:       Balloon dilation (Bilateral: Ear)      Myringotomy tube (Bilateral) Diagnosis:       Pressure sensation in ear, bilateral      Environmental allergies      (Pressure sensation in ear, bilateral [H93.8X3])      (Environmental allergies [Z91.09])    Surgeons: Alexis Ferrera MD Responsible Provider: Janneth Hatch APRN - CRNA    Anesthesia Type: general ASA Status: 2            Anesthesia Type: No value filed.    Bree Phase I: Bree Score: 10    Bree Phase II:      Anesthesia Post Evaluation    Patient location during evaluation: PACU  Patient participation: complete - patient participated  Level of consciousness: awake and alert  Pain score: 0  Airway patency: patent  Nausea & Vomiting: no nausea and no vomiting  Cardiovascular status: hemodynamically stable and blood pressure returned to baseline  Respiratory status: acceptable and room air  Hydration status: stable  Comments: /68   Pulse 77   Temp 97.4 °F (36.3 °C)   Resp 24   SpO2 97%     Pain management: adequate    No notable events documented.

## 2025-08-01 NOTE — INTERVAL H&P NOTE
Update History & Physical    The patient's History and Physical of July 3, 2025 was reviewed with the patient and I examined the patient. There was no change. The surgical site was confirmed by the patient and me.     Plan: The risks, benefits, expected outcome, and alternative to the recommended procedure have been discussed with the patient. Patient understands and wants to proceed with the procedure.     Electronically signed by Alexis Ferrera MD on 8/1/2025 at 6:31 AM

## 2025-08-01 NOTE — DISCHARGE INSTRUCTIONS
Please call Dr. Ferrera with questions or concerns.  Drops the next 10 days and pain meds as needed.  Follow-up in about a month

## 2025-08-01 NOTE — BRIEF OP NOTE
Brief Postoperative Note      Patient: Aline Martell  YOB: 1995  MRN: [id number]    Date of Procedure: 8/1/2025    Pre-Op Diagnosis Codes:      * Pressure sensation in ear, bilateral [H93.8X3]     * Environmental allergies [Z91.09]    Post-Op Diagnosis: Same       Procedure(s):  Balloon dilation  Myringotomy tube    Surgeon(s):  Alexis Ferrera MD    Assistant:  * No surgical staff found *    Anesthesia: Choice    Estimated Blood Loss (mL): Minimal    Complications: None    Specimens:   * No specimens in log *    Implants:  Implant Name Type Inv. Item Serial No.  Lot No. LRB No. Used Action   TUBE EAR VENT APPIAH GROM 1.14 MM FLUROPLAST BLUE STERILE - [id number]  TUBE EAR VENT APPIAH GROM 1.14 MM FLUROPLAST BLUE STERILE  [employer]- [id number] Left 1 Implanted   TUBE EAR VENT APPIAH GROM 1.14 MM FLUROPLAST BLUE STERILE - [id number]  TUBE EAR VENT APPIAH GROM 1.14 MM FLUROPLAST BLUE STERILE  [employer] [id number] Right 1 Implanted         Drains: * No LDAs found *    Findings:  Present At Time Of Surgery (PATOS) (choose all levels that have infection present):  No infection present  Other Findings: clear middle ears    Electronically signed by Alexis Ferrera MD on 8/1/2025 at 7:44 AM

## 2025-08-02 DIAGNOSIS — Z91.09 ENVIRONMENTAL ALLERGIES: ICD-10-CM

## 2025-08-04 RX ORDER — PSEUDOEPHEDRINE HCL 120 MG/1
TABLET, FILM COATED, EXTENDED RELEASE ORAL
Qty: 20 TABLET | Refills: 0 | Status: SHIPPED | OUTPATIENT
Start: 2025-08-04

## 2025-08-04 RX ORDER — AZELASTINE HYDROCHLORIDE 137 UG/1
SPRAY, METERED NASAL
Qty: 30 ML | Refills: 0 | Status: SHIPPED | OUTPATIENT
Start: 2025-08-04

## 2025-08-07 ENCOUNTER — OFFICE VISIT (OUTPATIENT)
Dept: INTERNAL MEDICINE | Facility: CLINIC | Age: 30
End: 2025-08-07
Payer: COMMERCIAL

## 2025-08-07 VITALS
TEMPERATURE: 98.6 F | BODY MASS INDEX: 36.37 KG/M2 | SYSTOLIC BLOOD PRESSURE: 110 MMHG | WEIGHT: 213 LBS | OXYGEN SATURATION: 98 % | HEIGHT: 64 IN | DIASTOLIC BLOOD PRESSURE: 82 MMHG | HEART RATE: 99 BPM | RESPIRATION RATE: 17 BRPM

## 2025-08-07 DIAGNOSIS — N61.0 NIPPLE INFECTION: Primary | ICD-10-CM

## 2025-08-07 PROCEDURE — 99214 OFFICE O/P EST MOD 30 MIN: CPT

## 2025-08-07 RX ORDER — OXYCODONE AND ACETAMINOPHEN 5; 325 MG/1; MG/1
TABLET ORAL
COMMUNITY
Start: 2025-07-31

## 2025-08-07 RX ORDER — OFLOXACIN 3 MG/ML
5 SOLUTION AURICULAR (OTIC)
COMMUNITY
Start: 2025-07-31 | End: 2025-08-11

## (undated) DEVICE — DEFOGGER!" ANTI FOG KIT: Brand: DEROYAL

## (undated) DEVICE — TOWEL,OR,DSP,ST,BLUE,DLX,4/PK,20PK/CS: Brand: MEDLINE

## (undated) DEVICE — GAUZE,SPONGE,4"X4",8PLY,STRL,LF,10/TRAY: Brand: MEDLINE

## (undated) DEVICE — BLADE 45DEG EAR UNITOM SPEAR TIP NAR

## (undated) DEVICE — ET DILATION SYSTEM: Brand: AUDION

## (undated) DEVICE — SPONGE,NEURO,0.5"X3",XR,STRL,LF,10/PK: Brand: MEDLINE

## (undated) DEVICE — SURGICAL SUCTION CONNECTING TUBE WITH MALE CONNECTOR AND SUCTION CLAMP, 2 FT. LONG (.6 M), 5 MM I.D.: Brand: CONMED

## (undated) DEVICE — SYRINGE 20ML LL S/C 50

## (undated) DEVICE — TUBING, SUCTION, 1/4" X 20', STRAIGHT: Brand: MEDLINE INDUSTRIES, INC.

## (undated) DEVICE — COVER,MAYO STAND,STERILE: Brand: MEDLINE

## (undated) DEVICE — CIRCUIT BRTH PED L108IN 1L BACT AND VIR FLTR PARA WYE 2 LIMB

## (undated) DEVICE — PITCHER PT 1200ML W HNDL CSR WRP